# Patient Record
Sex: FEMALE | Race: WHITE | NOT HISPANIC OR LATINO | Employment: FULL TIME | URBAN - METROPOLITAN AREA
[De-identification: names, ages, dates, MRNs, and addresses within clinical notes are randomized per-mention and may not be internally consistent; named-entity substitution may affect disease eponyms.]

---

## 2023-04-24 ENCOUNTER — OFFICE VISIT (OUTPATIENT)
Dept: PRIMARY CARE | Facility: CLINIC | Age: 33
End: 2023-04-24
Payer: MEDICAID

## 2023-04-24 ENCOUNTER — LAB (OUTPATIENT)
Dept: LAB | Facility: LAB | Age: 33
End: 2023-04-24
Payer: MEDICAID

## 2023-04-24 VITALS
BODY MASS INDEX: 20.88 KG/M2 | DIASTOLIC BLOOD PRESSURE: 78 MMHG | SYSTOLIC BLOOD PRESSURE: 106 MMHG | HEIGHT: 67 IN | OXYGEN SATURATION: 98 % | WEIGHT: 133 LBS | HEART RATE: 64 BPM

## 2023-04-24 DIAGNOSIS — G43.109 MIGRAINE WITH AURA AND WITHOUT STATUS MIGRAINOSUS, NOT INTRACTABLE: ICD-10-CM

## 2023-04-24 DIAGNOSIS — R35.0 URINARY FREQUENCY: Primary | ICD-10-CM

## 2023-04-24 DIAGNOSIS — L65.9 HAIR LOSS: ICD-10-CM

## 2023-04-24 DIAGNOSIS — R30.0 DYSURIA: ICD-10-CM

## 2023-04-24 LAB
CALCIDIOL (25 OH VITAMIN D3) (NG/ML) IN SER/PLAS: 70 NG/ML
ERYTHROCYTE DISTRIBUTION WIDTH (RATIO) BY AUTOMATED COUNT: 12.6 % (ref 11.5–14.5)
ERYTHROCYTE MEAN CORPUSCULAR HEMOGLOBIN CONCENTRATION (G/DL) BY AUTOMATED: 32.9 G/DL (ref 32–36)
ERYTHROCYTE MEAN CORPUSCULAR VOLUME (FL) BY AUTOMATED COUNT: 89 FL (ref 80–100)
ERYTHROCYTES (10*6/UL) IN BLOOD BY AUTOMATED COUNT: 4.75 X10E12/L (ref 4–5.2)
HEMATOCRIT (%) IN BLOOD BY AUTOMATED COUNT: 42.5 % (ref 36–46)
HEMOGLOBIN (G/DL) IN BLOOD: 14 G/DL (ref 12–16)
LEUKOCYTES (10*3/UL) IN BLOOD BY AUTOMATED COUNT: 4.4 X10E9/L (ref 4.4–11.3)
PLATELETS (10*3/UL) IN BLOOD AUTOMATED COUNT: 353 X10E9/L (ref 150–450)
POC APPEARANCE, URINE: CLEAR
POC BILIRUBIN, URINE: NEGATIVE
POC BLOOD, URINE: NEGATIVE
POC COLOR, URINE: YELLOW
POC GLUCOSE, URINE: NEGATIVE MG/DL
POC KETONES, URINE: NEGATIVE MG/DL
POC LEUKOCYTES, URINE: NEGATIVE
POC NITRITE,URINE: NEGATIVE
POC PH, URINE: 7 PH
POC PROTEIN, URINE: NEGATIVE MG/DL
POC SPECIFIC GRAVITY, URINE: 1.01
POC UROBILINOGEN, URINE: 0.2 EU/DL
THYROTROPIN (MIU/L) IN SER/PLAS BY DETECTION LIMIT <= 0.05 MIU/L: 1.07 MIU/L (ref 0.44–3.98)

## 2023-04-24 PROCEDURE — 82306 VITAMIN D 25 HYDROXY: CPT

## 2023-04-24 PROCEDURE — 85027 COMPLETE CBC AUTOMATED: CPT

## 2023-04-24 PROCEDURE — 84443 ASSAY THYROID STIM HORMONE: CPT

## 2023-04-24 PROCEDURE — 82728 ASSAY OF FERRITIN: CPT

## 2023-04-24 PROCEDURE — 81003 URINALYSIS AUTO W/O SCOPE: CPT | Performed by: INTERNAL MEDICINE

## 2023-04-24 PROCEDURE — 36415 COLL VENOUS BLD VENIPUNCTURE: CPT

## 2023-04-24 PROCEDURE — 99203 OFFICE O/P NEW LOW 30 MIN: CPT | Performed by: INTERNAL MEDICINE

## 2023-04-24 PROCEDURE — 87086 URINE CULTURE/COLONY COUNT: CPT

## 2023-04-24 PROCEDURE — 1036F TOBACCO NON-USER: CPT | Performed by: INTERNAL MEDICINE

## 2023-04-24 RX ORDER — ACETAMINOPHEN 500 MG
TABLET ORAL DAILY
COMMUNITY

## 2023-04-24 RX ORDER — UBIDECARENONE 30 MG
30 CAPSULE ORAL DAILY
COMMUNITY
End: 2023-11-14 | Stop reason: WASHOUT

## 2023-04-24 RX ORDER — ATOGEPANT 30 MG/1
TABLET ORAL
COMMUNITY
Start: 2023-03-29 | End: 2023-11-14 | Stop reason: WASHOUT

## 2023-04-24 RX ORDER — EPINEPHRINE 0.3 MG/.3ML
0.3 INJECTION SUBCUTANEOUS
COMMUNITY
Start: 2021-05-19 | End: 2023-11-14 | Stop reason: WASHOUT

## 2023-04-24 RX ORDER — CHOLECALCIFEROL (VITAMIN D3) 125 MCG
CAPSULE ORAL
COMMUNITY

## 2023-04-24 RX ORDER — MAGNESIUM 250 MG
250 TABLET ORAL 2 TIMES DAILY
COMMUNITY

## 2023-04-24 RX ORDER — UBROGEPANT 100 MG/1
TABLET ORAL
COMMUNITY

## 2023-04-24 ASSESSMENT — PATIENT HEALTH QUESTIONNAIRE - PHQ9
SUM OF ALL RESPONSES TO PHQ9 QUESTIONS 1 & 2: 0
1. LITTLE INTEREST OR PLEASURE IN DOING THINGS: NOT AT ALL
2. FEELING DOWN, DEPRESSED OR HOPELESS: NOT AT ALL

## 2023-04-24 NOTE — PROGRESS NOTES
Subjective   Patient ID: [id number]      Tobias Mejia is a 32 y.o. female who presents for New Patient Visit.    Current Outpatient Medications:     cholecalciferol (Vitamin D3) 50 mcg (2,000 unit) capsule, Take by mouth once daily., Disp: , Rfl:     co-enzyme Q-10 30 mg capsule, Take 1 capsule (30 mg) by mouth once daily., Disp: , Rfl:     EPINEPHrine 0.3 mg/0.3 mL injection syringe, Inject 0.3 mL (0.3 mg) into the shoulder, thigh, or buttocks., Disp: , Rfl:     magnesium 250 mg tablet, Take 1 tablet (250 mg) by mouth in the morning and 1 tablet (250 mg) in the evening., Disp: , Rfl:     MULTIVITAMIN ORAL, Take by mouth., Disp: , Rfl:     omega 3-dha-epa-fish oil 850-1,400 mg capsule, Take by mouth., Disp: , Rfl:     onabotulinumtoxinA (Botox) 100 unit injection, Inject as directed every 3 months., Disp: , Rfl:     Qulipta 30 mg tablet, , Disp: , Rfl:     Ubrelvy 100 mg tablet tablet, take 1 tablet by mouth ON THE ONSET OF MIGRAINE, Disp: , Rfl:   HPI  32-year-old female patient presenting to the office today to establish care and to discuss few concerns.    Patient has been having recurrent urinary symptoms, she has been experiencing dysuria and increased frequency with urination lately.  She has been treated for urinary tract infection 3 times within the last 3 months.  She has not had issues with urinary infections in the past.  She has been trying to increase her fluid intake and empty the bladder around the clock.    She also is known to have history of migraine headaches she is actively followed by neurologist with [facility] she recently was started on new preventative medication that she is not sure if it is helping yet its been a week.  She has noticed lately that she is losing hair significantly and feeling tired and sluggish and having constipation she wonders about her thyroid levels.  Review of system was reviewed all normal except what is noted in HPI   Past Medical History:   Diagnosis  "Date    Migraine with aura, intractable, with status migrainosus 12/17/2021    Intractable migraine with aura with status migrainosus    Other conditions influencing health status 05/20/2022    Chronic migraine    Other specified endocrine disorders 01/29/2022    Cyst of pineal gland      Objective   /78   Pulse 64   Ht 1.702 m (5' 7\")   Wt 60.3 kg (133 lb)   SpO2 98%   BMI 20.83 kg/m²      Physical Exam  Constitutional:       Appearance: Normal appearance.   Cardiovascular:      Rate and Rhythm: Normal rate and regular rhythm.      Pulses: Normal pulses.      Heart sounds: Normal heart sounds.   Pulmonary:      Effort: Pulmonary effort is normal.      Breath sounds: Normal breath sounds.   Musculoskeletal:      Cervical back: Normal range of motion and neck supple.   Neurological:      Mental Status: She is alert.   Psychiatric:         Mood and Affect: Mood normal.         Behavior: Behavior normal.         Thought Content: Thought content normal.         Judgment: Judgment normal.         Assessment/Plan   Problem List Items Addressed This Visit    None  Visit Diagnoses       Urinary frequency    -  Primary    Relevant Orders    POCT UA Automated manually resulted    Urine Culture    Dysuria        Relevant Orders    POCT UA Automated manually resulted    Urine Culture    Hair loss        Relevant Orders    CBC    TSH with reflex to Free T4 if abnormal    Vitamin D 25 hydroxy    Ferritin    Migraine with aura and without status migrainosus, not intractable        Relevant Orders    Vitamin D 25 hydroxy    Ferritin          32-year-old patient with the following issues.    1.  Concerns regarding urinary symptoms, at this point we are going to obtain a urine analysis and send it for culture we will treat the patient accordingly.  Patient was advised to increase her fluid intake and empty the bladder around the clock and take cranberry extract tablets and if she continues to have the recurrence we will " refer to urology for further evaluation.    2.  Concerns regarding hair loss and constipation and feeling cold at all times we are going to make sure we rule out any thyroid abnormalities and anemia and we will discuss the results with the patient as available.    3.  Recurrent migraines actively followed by the neurology department currently started on preventive treatment in addition to her abortive treatment she was encouraged to continue.    Disposition I will see the patient back in the office in 3 months for her annual exam and sooner if needed.    Audrey Ramos MD

## 2023-04-25 LAB
FERRITIN (UG/LL) IN SER/PLAS: 33 UG/L (ref 8–150)
URINE CULTURE: NORMAL

## 2023-10-16 ENCOUNTER — SPECIALTY PHARMACY (OUTPATIENT)
Dept: PHARMACY | Facility: CLINIC | Age: 33
End: 2023-10-16

## 2023-10-16 ENCOUNTER — PHARMACY VISIT (OUTPATIENT)
Dept: PHARMACY | Facility: CLINIC | Age: 33
End: 2023-10-16
Payer: MEDICAID

## 2023-10-16 ENCOUNTER — TELEPHONE (OUTPATIENT)
Dept: PRIMARY CARE | Facility: CLINIC | Age: 33
End: 2023-10-16
Payer: MEDICAID

## 2023-10-16 PROCEDURE — RXMED WILLOW AMBULATORY MEDICATION CHARGE

## 2023-10-16 NOTE — TELEPHONE ENCOUNTER
Left detailed msg for pt to call back and speak with us at  if she may have lvm by mistake on refill line.

## 2023-10-19 ENCOUNTER — APPOINTMENT (OUTPATIENT)
Dept: PRIMARY CARE | Facility: CLINIC | Age: 33
End: 2023-10-19
Payer: MEDICAID

## 2023-10-24 ENCOUNTER — APPOINTMENT (OUTPATIENT)
Dept: PRIMARY CARE | Facility: CLINIC | Age: 33
End: 2023-10-24
Payer: MEDICAID

## 2023-11-08 ENCOUNTER — PHARMACY VISIT (OUTPATIENT)
Dept: PHARMACY | Facility: CLINIC | Age: 33
End: 2023-11-08
Payer: MEDICAID

## 2023-11-08 PROCEDURE — RXMED WILLOW AMBULATORY MEDICATION CHARGE

## 2023-11-12 PROBLEM — F43.10 PTSD (POST-TRAUMATIC STRESS DISORDER): Status: ACTIVE | Noted: 2018-02-05

## 2023-11-12 PROBLEM — F43.21 GRIEF: Status: ACTIVE | Noted: 2017-07-06

## 2023-11-12 PROBLEM — E34.8 CYST OF PINEAL GLAND: Status: ACTIVE | Noted: 2023-11-12

## 2023-11-12 PROBLEM — G43.111 INTRACTABLE MIGRAINE WITH AURA WITH STATUS MIGRAINOSUS: Status: ACTIVE | Noted: 2023-11-12

## 2023-11-12 PROBLEM — G43.109 MIGRAINE WITH VISUAL AURA: Status: ACTIVE | Noted: 2023-11-12

## 2023-11-12 PROBLEM — G43.709 CHRONIC MIGRAINE WITHOUT AURA: Status: ACTIVE | Noted: 2023-11-12

## 2023-11-12 PROBLEM — S20.219A RIB CONTUSION: Status: ACTIVE | Noted: 2023-11-12

## 2023-11-12 PROBLEM — F32.A DEPRESSION: Status: ACTIVE | Noted: 2017-07-06

## 2023-11-12 RX ORDER — KETOTIFEN FUMARATE 0.35 MG/ML
1 SOLUTION/ DROPS OPHTHALMIC 2 TIMES DAILY
COMMUNITY
End: 2023-11-14 | Stop reason: WASHOUT

## 2023-11-12 RX ORDER — AZELASTINE 1 MG/ML
2 SPRAY, METERED NASAL 2 TIMES DAILY PRN
COMMUNITY
End: 2023-11-14 | Stop reason: WASHOUT

## 2023-11-12 RX ORDER — FLUTICASONE PROPIONATE 50 MCG
2 SPRAY, SUSPENSION (ML) NASAL DAILY
COMMUNITY
End: 2023-11-14 | Stop reason: WASHOUT

## 2023-11-12 RX ORDER — DESLORATADINE 5 MG/1
5 TABLET ORAL
COMMUNITY
Start: 2021-05-19 | End: 2023-11-14 | Stop reason: WASHOUT

## 2023-11-14 ENCOUNTER — OFFICE VISIT (OUTPATIENT)
Dept: PRIMARY CARE | Facility: CLINIC | Age: 33
End: 2023-11-14
Payer: MEDICAID

## 2023-11-14 VITALS
HEIGHT: 66 IN | OXYGEN SATURATION: 99 % | DIASTOLIC BLOOD PRESSURE: 82 MMHG | WEIGHT: 138.2 LBS | SYSTOLIC BLOOD PRESSURE: 104 MMHG | BODY MASS INDEX: 22.21 KG/M2 | HEART RATE: 70 BPM

## 2023-11-14 DIAGNOSIS — Z00.00 HEALTH CARE MAINTENANCE: Primary | ICD-10-CM

## 2023-11-14 DIAGNOSIS — Z80.8 FAMILY HISTORY OF MELANOMA: ICD-10-CM

## 2023-11-14 DIAGNOSIS — L65.9 HAIR LOSS: ICD-10-CM

## 2023-11-14 DIAGNOSIS — N92.1 MENORRHAGIA WITH IRREGULAR CYCLE: ICD-10-CM

## 2023-11-14 DIAGNOSIS — D22.9 CHANGE IN MOLE: ICD-10-CM

## 2023-11-14 PROCEDURE — 99395 PREV VISIT EST AGE 18-39: CPT | Performed by: INTERNAL MEDICINE

## 2023-11-14 PROCEDURE — 1036F TOBACCO NON-USER: CPT | Performed by: INTERNAL MEDICINE

## 2023-11-14 RX ORDER — UBIDECARENONE 30 MG
30 CAPSULE ORAL DAILY
COMMUNITY

## 2023-11-14 NOTE — PROGRESS NOTES
Subjective   Patient ID: Tobias Mejia is a 33 y.o. female who presents for Annual Exam.    HPI   Patient presents today to annual exam.     Patient states that her periods have been behind since May but she is still having them.     Review of Systems    Objective   There were no vitals taken for this visit.    Physical Exam    Assessment/Plan   Problem List Items Addressed This Visit    None

## 2023-11-14 NOTE — PROGRESS NOTES
"Reason for Visit: Annual Physical Exam  Allergies and Medications  Green bean, Legumes, Peanut (legumes), Pistachio nut, Strawberry, Copper, House dust mite, and Tree and shrub pollen  Current Outpatient Medications   Medication Instructions    cholecalciferol (Vitamin D3) 50 mcg (2,000 unit) capsule oral, Daily    co-enzyme Q-10 30 mg, oral, Daily    fremanezumab (Ajovy) 225 mg/1.5 mL auto-injector INJECT 225MG (1 PEN) UNDER THE SKIN ONCE MONTHLY    magnesium 250 mg, oral, 2 times daily    MULTIVITAMIN ORAL oral    omega 3-dha-epa-fish oil 850-1,400 mg capsule oral    onabotulinumtoxinA (Botox) 100 unit injection injection, Every 3 months    Ubrelvy 100 mg tablet tablet take 1 tablet by mouth ON THE ONSET OF MIGRAINE     HPI:  53-year-old female patient presented to the office today for her annual exam.  Patient is doing really well denying episodes of chest pain and shortness of breath on minimal exertion, no abdominal pain nausea or vomiting she is always constipated and she is willing to try MiraLAX actually we did discuss it today at length.  No urinary symptoms.  She still is concerned about hair loss that she does have heavy cycles.  Sometimes her cycle is 6 weeks apart but then when she does have it it is really heavy and she has had a clotting for 5 days.    ROS otherwise negative aside from what was mentioned above in HPI.    Vitals  /82 (BP Location: Left arm, Patient Position: Sitting)   Pulse 70   Ht 1.683 m (5' 6.25\")   Wt 62.7 kg (138 lb 3.2 oz)   SpO2 99%   BMI 22.14 kg/m²   Body mass index is 22.14 kg/m².  Physical Exam  Physical Exam  Constitutional:       Appearance: Normal appearance.   Eyes:      Extraocular Movements: Extraocular movements intact.      Pupils: Pupils are equal, round, and reactive to light.   Cardiovascular:      Rate and Rhythm: Normal rate and regular rhythm.      Pulses: Normal pulses.      Heart sounds: Normal heart sounds.   Pulmonary:      Effort: Pulmonary " effort is normal.      Breath sounds: Normal breath sounds.   Chest:      Comments: Breast exam was completed no asymmetry masses or discharge.  Neurological:      General: No focal deficit present.      Mental Status: She is alert. Mental status is at baseline.   Psychiatric:         Mood and Affect: Mood normal.         Behavior: Behavior normal.         Thought Content: Thought content normal.         Judgment: Judgment normal.        Active Problem List    Comprehensive Medical/Surgical/Social/Family History  Past Medical History:   Diagnosis Date    Migraine with aura, intractable, with status migrainosus 12/17/2021    Intractable migraine with aura with status migrainosus    Other conditions influencing health status 05/20/2022    Chronic migraine    Other specified endocrine disorders 01/29/2022    Cyst of pineal gland     Past Surgical History:   Procedure Laterality Date    MR HEAD ANGIO WO IV CONTRAST  1/10/2022    MR HEAD ANGIO WO IV CONTRAST 1/10/2022 [facility]    OTHER SURGICAL HISTORY  05/03/2022    No history of surgery     Social History     Social History Narrative    Not on file   Patient does not smoke and does not drink alcohol does not drink caffeine she works in [employer] company as she.  She has 1 daughter currently in good health.  Family History   Problem Relation Name Age of Onset    Other (ocular melanoma) Mother     She has 1 twin sister in good health.      Assessment and Plan:  Problem List Items Addressed This Visit    None  Visit Diagnoses       Health care maintenance    -  Primary    Relevant Orders    CBC    Comprehensive Metabolic Panel    Lipid Panel    TSH with reflex to Free T4 if abnormal    Menorrhagia with irregular cycle        Relevant Orders    US pelvis    Hair loss        Relevant Orders    Referral to Dermatology        53-year-old patient with the following issues.    1.  Ongoing concerns regarding menorrhagia and increased clotting with the cycle.  At this  point pelvic ultrasound will be obtained CBC will be obtained her most recent ferritin was appropriate we will discuss the results with the patient once available.    2.  Ongoing her loss her TSH is normal we are going to do dermatology referral and she does have family history of ocular melanoma and we will make sure her moles are checked carefully and thoroughly with dermatology as well.    3.  Health maintenance issues lab work is requested patient is up-to-date with her Pap smear.    Disposition I will see the patient back in the office in 1 year for repeat physical and sooner if needed.

## 2023-11-15 ENCOUNTER — LAB (OUTPATIENT)
Dept: LAB | Facility: LAB | Age: 33
End: 2023-11-15
Payer: MEDICAID

## 2023-11-15 DIAGNOSIS — Z00.00 HEALTH CARE MAINTENANCE: ICD-10-CM

## 2023-11-15 LAB
ALBUMIN SERPL BCP-MCNC: 4.2 G/DL (ref 3.4–5)
ALP SERPL-CCNC: 59 U/L (ref 33–110)
ALT SERPL W P-5'-P-CCNC: 11 U/L (ref 7–45)
ANION GAP SERPL CALC-SCNC: 14 MMOL/L (ref 10–20)
AST SERPL W P-5'-P-CCNC: 15 U/L (ref 9–39)
BILIRUB SERPL-MCNC: 0.5 MG/DL (ref 0–1.2)
BUN SERPL-MCNC: 14 MG/DL (ref 6–23)
CALCIUM SERPL-MCNC: 9.1 MG/DL (ref 8.6–10.3)
CHLORIDE SERPL-SCNC: 104 MMOL/L (ref 98–107)
CHOLEST SERPL-MCNC: 159 MG/DL (ref 0–199)
CHOLESTEROL/HDL RATIO: 3
CO2 SERPL-SCNC: 27 MMOL/L (ref 21–32)
CREAT SERPL-MCNC: 0.6 MG/DL (ref 0.5–1.05)
ERYTHROCYTE [DISTWIDTH] IN BLOOD BY AUTOMATED COUNT: 13.3 % (ref 11.5–14.5)
GFR SERPL CREATININE-BSD FRML MDRD: >90 ML/MIN/1.73M*2
GLUCOSE SERPL-MCNC: 77 MG/DL (ref 74–99)
HCT VFR BLD AUTO: 39.3 % (ref 36–46)
HDLC SERPL-MCNC: 53.4 MG/DL
HGB BLD-MCNC: 12.7 G/DL (ref 12–16)
LDLC SERPL CALC-MCNC: 94 MG/DL
MCH RBC QN AUTO: 29.3 PG (ref 26–34)
MCHC RBC AUTO-ENTMCNC: 32.3 G/DL (ref 32–36)
MCV RBC AUTO: 91 FL (ref 80–100)
NON HDL CHOLESTEROL: 106 MG/DL (ref 0–149)
NRBC BLD-RTO: 0 /100 WBCS (ref 0–0)
PLATELET # BLD AUTO: 336 X10*3/UL (ref 150–450)
POTASSIUM SERPL-SCNC: 3.7 MMOL/L (ref 3.5–5.3)
PROT SERPL-MCNC: 6.9 G/DL (ref 6.4–8.2)
RBC # BLD AUTO: 4.33 X10*6/UL (ref 4–5.2)
SODIUM SERPL-SCNC: 141 MMOL/L (ref 136–145)
TRIGL SERPL-MCNC: 56 MG/DL (ref 0–149)
TSH SERPL-ACNC: 1.59 MIU/L (ref 0.44–3.98)
VLDL: 11 MG/DL (ref 0–40)
WBC # BLD AUTO: 4.2 X10*3/UL (ref 4.4–11.3)

## 2023-11-15 PROCEDURE — 36415 COLL VENOUS BLD VENIPUNCTURE: CPT

## 2023-11-15 PROCEDURE — 84443 ASSAY THYROID STIM HORMONE: CPT

## 2023-11-15 PROCEDURE — 85027 COMPLETE CBC AUTOMATED: CPT

## 2023-11-15 PROCEDURE — 80053 COMPREHEN METABOLIC PANEL: CPT

## 2023-11-15 PROCEDURE — 80061 LIPID PANEL: CPT

## 2023-11-17 ENCOUNTER — TELEPHONE (OUTPATIENT)
Dept: PRIMARY CARE | Facility: CLINIC | Age: 33
End: 2023-11-17
Payer: MEDICAID

## 2023-11-17 NOTE — TELEPHONE ENCOUNTER
----- Message from Audrey Ramos MD sent at 11/16/2023  4:49 PM EST -----  Can you please notify the patient with the results of her blood work indicating normal thyroid function no evidence of anemia, excellent kidney and liver function normal fasting blood sugar.  Her lipid profile is excellent as well.  She has any questions or concerns she is to contact my office anytime.

## 2023-11-20 ENCOUNTER — PROCEDURE VISIT (OUTPATIENT)
Dept: NEUROLOGY | Facility: CLINIC | Age: 33
End: 2023-11-20
Payer: MEDICAID

## 2023-11-20 ENCOUNTER — HOSPITAL ENCOUNTER (OUTPATIENT)
Dept: RADIOLOGY | Facility: HOSPITAL | Age: 33
Discharge: HOME | End: 2023-11-20
Payer: MEDICAID

## 2023-11-20 DIAGNOSIS — G43.711 CHRONIC MIGRAINE WITHOUT AURA, WITH INTRACTABLE MIGRAINE, SO STATED, WITH STATUS MIGRAINOSUS: Primary | ICD-10-CM

## 2023-11-20 DIAGNOSIS — N92.1 MENORRHAGIA WITH IRREGULAR CYCLE: ICD-10-CM

## 2023-11-20 PROCEDURE — 64615 CHEMODENERV MUSC MIGRAINE: CPT | Performed by: STUDENT IN AN ORGANIZED HEALTH CARE EDUCATION/TRAINING PROGRAM

## 2023-11-20 PROCEDURE — 76830 TRANSVAGINAL US NON-OB: CPT

## 2023-11-20 ASSESSMENT — ENCOUNTER SYMPTOMS
DEPRESSION: 0
LOSS OF SENSATION IN FEET: 0
OCCASIONAL FEELINGS OF UNSTEADINESS: 0

## 2023-11-20 ASSESSMENT — PATIENT HEALTH QUESTIONNAIRE - PHQ9
2. FEELING DOWN, DEPRESSED OR HOPELESS: NOT AT ALL
SUM OF ALL RESPONSES TO PHQ9 QUESTIONS 1 AND 2: 0
1. LITTLE INTEREST OR PLEASURE IN DOING THINGS: NOT AT ALL

## 2023-11-20 NOTE — PROGRESS NOTES
Neurology Botulinum Injection    Subjective   Tobias Mejia is an 33 y.o. female here for medical botulinum injection for Chronic migraine without aura, with intractable migraine, so stated, with status migrainosus [G43.711].     Headaches have reduced to 6/30 HA days per month since starting concomitant Botox and Ajovy. Denies any side effects to either medication. Ubrelvy effective for breakthrough headaches. Notes increased headaches in the last 3 weeks before todays Botox.    Head/Face/Jaw Botulinum Injection    Date/Time: 11/20/2023 10:12 AM    Performed by: Brent Alarcon DO  Authorized by: Brent Alarcon DO      Procedure details:        Total units injected:  0     Total units wasted:  0    Comments: Procedure Note: PREEMPT Botox    Indications: Chronic Migraine    Informed consent was obtained (explaining the procedure and risks and benefits of procedure) from patient: the signed consent form was placed in the medical record.  A time out was completed, verifying correct patient, procedure,site, positioning, and implants or special equipment.    200 units of OnabotulinumtoxinA were reconstituted with saline. Sites of injection were identified via PREEMPT protocol and cleaned with alcohol pads. Using a 30 gauge needle, patient received following injections:    5 units in procerus  5 units in each left and right   10 units divided between 2 sites of L frontalis  10 units divided between 2 sites of R frontalis  20 units divided between 4 sites of L temporalis  20 units divided between 4 sites of R temporalis  15 units divided between 3 sites of L occipitalis  15 units divided between 3 sites of R occipitalis  10 units divided between 2 sites of L paracervical muscles  10 units divided between 2 sites of R paracervical muscles  15 units divided between 3 sites of L trapezius  15 units divided between 3 sites of R trapezius    Additional Sites:  10u R temporalis  10u L temporalis  5u R  masseter  5u L masseter    Used: 185u  Wasted: 15u    There were no complications. Patient was comfortable and left without complaint.     OnabotulinumtoxinA  Lot #: [id number]  Exp: 3/2026

## 2023-11-21 ENCOUNTER — APPOINTMENT (OUTPATIENT)
Dept: PRIMARY CARE | Facility: CLINIC | Age: 33
End: 2023-11-21
Payer: MEDICAID

## 2023-12-08 PROCEDURE — RXMED WILLOW AMBULATORY MEDICATION CHARGE

## 2023-12-12 ENCOUNTER — PHARMACY VISIT (OUTPATIENT)
Dept: PHARMACY | Facility: CLINIC | Age: 33
End: 2023-12-12
Payer: MEDICAID

## 2024-01-05 ENCOUNTER — SPECIALTY PHARMACY (OUTPATIENT)
Dept: PHARMACY | Facility: CLINIC | Age: 34
End: 2024-01-05

## 2024-01-05 NOTE — PROGRESS NOTES
[facility] Clinical Note    Tobias Mejia is a 33 y.o. female, who is on the specialty pharmacy service for management of: Migraine Core with status of: (Enrolled)     Tobias was contacted on 1/5/2024.    Refer to the encounter summary report for documentation details about patient counseling and education.      Medication Adherence  The importance of adherence was discussed with the patient and they were advised to take the medication as prescribed by their provider. Tobias was encouraged to call her physician's office if they have a question regarding a missed dose.     Conclusion  Rate your quality of life on scale of 1-10: 7  Rate your satisfaction with  Specialty Pharmacy on scale of 1-10: 9      Patient advised to contact the pharmacy if there are any changes to her medication list, including prescriptions, OTC medications, herbal products, or supplements. Patient was advised of [facility]’s dispensing process, refill timeline, contact information ([phone]), and patient management follow up. Patient confirmed understanding of education conducted during assessment. All patient questions and concerns were addressed to the best of my ability. Patient was encouraged to contact the specialty pharmacy with any questions or concerns.    Confirmed follow-up outreaches are properly scheduled. Reviewed goals of therapy in the program targets.    Shan Wilde, ThereseD

## 2024-01-08 ENCOUNTER — PHARMACY VISIT (OUTPATIENT)
Dept: PHARMACY | Facility: CLINIC | Age: 34
End: 2024-01-08
Payer: MEDICAID

## 2024-01-08 PROCEDURE — RXMED WILLOW AMBULATORY MEDICATION CHARGE

## 2024-02-05 ENCOUNTER — PHARMACY VISIT (OUTPATIENT)
Dept: PHARMACY | Facility: CLINIC | Age: 34
End: 2024-02-05
Payer: MEDICAID

## 2024-02-05 PROCEDURE — RXMED WILLOW AMBULATORY MEDICATION CHARGE

## 2024-02-19 ENCOUNTER — PROCEDURE VISIT (OUTPATIENT)
Dept: NEUROLOGY | Facility: CLINIC | Age: 34
End: 2024-02-19
Payer: MEDICAID

## 2024-02-19 ENCOUNTER — OFFICE VISIT (OUTPATIENT)
Dept: DERMATOLOGY | Facility: CLINIC | Age: 34
End: 2024-02-19
Payer: MEDICAID

## 2024-02-19 DIAGNOSIS — G43.709 CHRONIC MIGRAINE WITHOUT AURA WITHOUT STATUS MIGRAINOSUS, NOT INTRACTABLE: Primary | ICD-10-CM

## 2024-02-19 DIAGNOSIS — D22.9 MELANOCYTIC NEVUS, UNSPECIFIED LOCATION: ICD-10-CM

## 2024-02-19 DIAGNOSIS — B36.0 TINEA VERSICOLOR: ICD-10-CM

## 2024-02-19 DIAGNOSIS — D48.5 NEOPLASM OF UNCERTAIN BEHAVIOR OF SKIN: Primary | ICD-10-CM

## 2024-02-19 DIAGNOSIS — L65.9 ALOPECIA: ICD-10-CM

## 2024-02-19 PROCEDURE — 88305 TISSUE EXAM BY PATHOLOGIST: CPT | Performed by: DERMATOLOGY

## 2024-02-19 PROCEDURE — 1036F TOBACCO NON-USER: CPT | Performed by: STUDENT IN AN ORGANIZED HEALTH CARE EDUCATION/TRAINING PROGRAM

## 2024-02-19 PROCEDURE — 11102 TANGNTL BX SKIN SINGLE LES: CPT | Performed by: STUDENT IN AN ORGANIZED HEALTH CARE EDUCATION/TRAINING PROGRAM

## 2024-02-19 PROCEDURE — 64615 CHEMODENERV MUSC MIGRAINE: CPT | Performed by: STUDENT IN AN ORGANIZED HEALTH CARE EDUCATION/TRAINING PROGRAM

## 2024-02-19 PROCEDURE — 99204 OFFICE O/P NEW MOD 45 MIN: CPT | Performed by: STUDENT IN AN ORGANIZED HEALTH CARE EDUCATION/TRAINING PROGRAM

## 2024-02-19 RX ORDER — MINOXIDIL 2.5 MG/1
1.25 TABLET ORAL DAILY
Qty: 45 TABLET | Refills: 3 | Status: SHIPPED | OUTPATIENT
Start: 2024-02-19 | End: 2025-02-18

## 2024-02-19 RX ORDER — KETOCONAZOLE 20 MG/G
CREAM TOPICAL 2 TIMES DAILY
Qty: 30 G | Refills: 11 | Status: SHIPPED | OUTPATIENT
Start: 2024-02-19

## 2024-02-19 RX ORDER — KETOCONAZOLE 20 MG/ML
SHAMPOO, SUSPENSION TOPICAL 2 TIMES WEEKLY
Qty: 120 ML | Refills: 11 | Status: SHIPPED | OUTPATIENT
Start: 2024-02-19

## 2024-02-19 ASSESSMENT — DERMATOLOGY QUALITY OF LIFE (QOL) ASSESSMENT
WHAT SINGLE SKIN CONDITION LISTED BELOW IS THE PATIENT ANSWERING THE QUALITY-OF-LIFE ASSESSMENT QUESTIONS ABOUT: NONE OF THE ABOVE
WHAT SINGLE SKIN CONDITION LISTED BELOW IS THE PATIENT ANSWERING THE QUALITY-OF-LIFE ASSESSMENT QUESTIONS ABOUT: NONE OF THE ABOVE

## 2024-02-19 NOTE — PROGRESS NOTES
Neurology Botulinum Injection    Subjective   Tobias Mejia is an 33 y.o. female here for medical botulinum injection for Chronic migraine without aura without status migrainosus, not intractable [G43.709].     Headaches have worsened in the last 3 months to 20/30 HA days per month. Denies any side effects to Botox or Ajovy. She is unsure if it is weather related. Notes that she had 2 significant aura episodes where she lost vision for ~1 hour with color wheels, as well as experiencing paresthesias in LUE. Symptoms were associated with headaches. Has not had a headache for the last 14 days. Would like to proceed with current headache treatment plan, however if any worsening of headaches over the next quarter, would consider switching MAB at that time.    Head/Face/Jaw Botulinum Injection    Date/Time: 2/19/2024 9:24 AM    Performed by: Brent Alarcon DO  Authorized by: Brent Alarcon DO      Procedure details:        Total units injected:  0     Total units wasted:  0    Comments: Procedure Note: PREEMPT Botox    Indications: Chronic Migraine    Informed consent was obtained (explaining the procedure and risks and benefits of procedure) from patient: the signed consent form was placed in the medical record.  A time out was completed, verifying correct patient, procedure,site, positioning, and implants or special equipment.    200 units of OnabotulinumtoxinA were reconstituted with saline. Sites of injection were identified via PREEMPT protocol and cleaned with alcohol pads. Using a 30 gauge needle, patient received following injections:    5 units in procerus  5 units in each left and right   10 units divided between 2 sites of L frontalis  10 units divided between 2 sites of R frontalis  20 units divided between 4 sites of L temporalis  20 units divided between 4 sites of R temporalis  15 units divided between 3 sites of L occipitalis  15 units divided between 3 sites of R occipitalis  10 units  divided between 2 sites of L paracervical muscles  10 units divided between 2 sites of R paracervical muscles  15 units divided between 3 sites of L trapezius  15 units divided between 3 sites of R trapezius    Additional Sites:  10u L temporalis  10u R temporalis  5u R masseter  5u L masseter    Used: 185u  Wasted: 15u    There were no complications. Patient was comfortable and left without complaint.     OnabotulinumtoxinA  Lot #: [id number]  Exp: 6/2026
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
## 2024-02-21 LAB
LABORATORY COMMENT REPORT: NORMAL
PATH REPORT.FINAL DX SPEC: NORMAL
PATH REPORT.GROSS SPEC: NORMAL
PATH REPORT.MICROSCOPIC SPEC OTHER STN: NORMAL
PATH REPORT.RELEVANT HX SPEC: NORMAL
PATH REPORT.TOTAL CANCER: NORMAL

## 2024-02-21 NOTE — RESULT ENCOUNTER NOTE
"Patient has seen results and \"Patient Communication\" on 2/21/21 at 4:12pm of shave biopsy of the Mid Back: Mildly Dyplastic Compound Nevus"

## 2024-03-04 ENCOUNTER — SPECIALTY PHARMACY (OUTPATIENT)
Dept: PHARMACY | Facility: CLINIC | Age: 34
End: 2024-03-04

## 2024-03-20 ENCOUNTER — PHARMACY VISIT (OUTPATIENT)
Dept: PHARMACY | Facility: CLINIC | Age: 34
End: 2024-03-20
Payer: MEDICAID

## 2024-03-20 PROCEDURE — RXMED WILLOW AMBULATORY MEDICATION CHARGE

## 2024-04-03 ENCOUNTER — SPECIALTY PHARMACY (OUTPATIENT)
Dept: PHARMACY | Facility: CLINIC | Age: 34
End: 2024-04-03

## 2024-04-03 NOTE — PROGRESS NOTES
[facility] Clinical Note    Tobias Mejia is a 33 y.o. female, who is on the specialty pharmacy service of: Migraine Core.  Tobias Mejia is taking: Ajovy.     Tobias was contacted on 4/3/2024.    Refer to the encounter summary report for documentation details about patient counseling and education.      Impression/Plan  Is patient high risk? (potential patients:  pregnancy, geriatric, pediatric)   no  Is laboratory follow-up needed? no  Is a clinical intervention needed?  no  Next assessment date?  10/4/24  Additional comments:    Medication Adherence  The importance of adherence was discussed with the patient and they were advised to take the medication as prescribed by their provider. Tobias was encouraged to call her physician's office if they have a question regarding a missed dose.     QOL/Patient Satisfaction  Rate your quality of life on scale of 1-10: 7  Rate your satisfaction with  Specialty Pharmacy on scale of 1-10: 10 - Completely satisfied      Patient advised to contact the pharmacy if there are any changes to her medication list, including prescriptions, OTC medications, herbal products, or supplements. Patient was advised of [facility]’s dispensing process, refill timeline, contact information ([phone]), and patient management follow up. Patient confirmed understanding of education conducted during assessment. All patient questions and concerns were addressed to the best of my ability. Patient was encouraged to contact the specialty pharmacy with any questions or concerns.    Confirmed follow-up outreaches are properly scheduled. Reviewed goals of therapy in the program targets.    Jonel Gilbert, PharmD

## 2024-04-17 ENCOUNTER — SPECIALTY PHARMACY (OUTPATIENT)
Dept: PHARMACY | Facility: CLINIC | Age: 34
End: 2024-04-17

## 2024-04-17 DIAGNOSIS — G43.709 CHRONIC MIGRAINE WITHOUT AURA WITHOUT STATUS MIGRAINOSUS, NOT INTRACTABLE: Primary | ICD-10-CM

## 2024-04-17 PROCEDURE — RXMED WILLOW AMBULATORY MEDICATION CHARGE

## 2024-04-17 RX ORDER — FREMANEZUMAB-VFRM 225 MG/1.5ML
225 INJECTION SUBCUTANEOUS
Qty: 1.5 ML | Refills: 6 | Status: SHIPPED | OUTPATIENT
Start: 2024-04-17 | End: 2024-04-22 | Stop reason: SDUPTHER

## 2024-04-19 ENCOUNTER — PHARMACY VISIT (OUTPATIENT)
Dept: PHARMACY | Facility: CLINIC | Age: 34
End: 2024-04-19
Payer: MEDICAID

## 2024-04-22 DIAGNOSIS — G43.709 CHRONIC MIGRAINE WITHOUT AURA WITHOUT STATUS MIGRAINOSUS, NOT INTRACTABLE: ICD-10-CM

## 2024-04-22 RX ORDER — FREMANEZUMAB-VFRM 225 MG/1.5ML
225 INJECTION SUBCUTANEOUS
Qty: 1.5 ML | Refills: 11 | Status: SHIPPED | OUTPATIENT
Start: 2024-04-22 | End: 2024-05-20 | Stop reason: WASHOUT

## 2024-05-01 DIAGNOSIS — G43.709 CHRONIC MIGRAINE WITHOUT AURA WITHOUT STATUS MIGRAINOSUS, NOT INTRACTABLE: Primary | ICD-10-CM

## 2024-05-02 ENCOUNTER — SPECIALTY PHARMACY (OUTPATIENT)
Dept: PHARMACY | Facility: CLINIC | Age: 34
End: 2024-05-02

## 2024-05-02 PROCEDURE — RXMED WILLOW AMBULATORY MEDICATION CHARGE

## 2024-05-03 ENCOUNTER — SPECIALTY PHARMACY (OUTPATIENT)
Dept: PHARMACY | Facility: CLINIC | Age: 34
End: 2024-05-03

## 2024-05-07 ENCOUNTER — PHARMACY VISIT (OUTPATIENT)
Dept: PHARMACY | Facility: CLINIC | Age: 34
End: 2024-05-07
Payer: MEDICAID

## 2024-05-13 PROCEDURE — RXMED WILLOW AMBULATORY MEDICATION CHARGE

## 2024-05-14 ENCOUNTER — PHARMACY VISIT (OUTPATIENT)
Dept: PHARMACY | Facility: CLINIC | Age: 34
End: 2024-05-14
Payer: MEDICAID

## 2024-05-14 ENCOUNTER — SPECIALTY PHARMACY (OUTPATIENT)
Dept: PHARMACY | Facility: CLINIC | Age: 34
End: 2024-05-14

## 2024-05-20 ENCOUNTER — PROCEDURE VISIT (OUTPATIENT)
Dept: NEUROLOGY | Facility: CLINIC | Age: 34
End: 2024-05-20
Payer: MEDICAID

## 2024-05-20 DIAGNOSIS — G43.709 CHRONIC MIGRAINE WITHOUT AURA WITHOUT STATUS MIGRAINOSUS, NOT INTRACTABLE: Primary | ICD-10-CM

## 2024-05-20 PROCEDURE — 99214 OFFICE O/P EST MOD 30 MIN: CPT | Performed by: STUDENT IN AN ORGANIZED HEALTH CARE EDUCATION/TRAINING PROGRAM

## 2024-05-20 PROCEDURE — 64615 CHEMODENERV MUSC MIGRAINE: CPT | Performed by: STUDENT IN AN ORGANIZED HEALTH CARE EDUCATION/TRAINING PROGRAM

## 2024-05-20 RX ORDER — ALBUTEROL SULFATE 0.83 MG/ML
3 SOLUTION RESPIRATORY (INHALATION) AS NEEDED
OUTPATIENT
Start: 2024-05-20

## 2024-05-20 RX ORDER — FAMOTIDINE 10 MG/ML
20 INJECTION INTRAVENOUS ONCE AS NEEDED
OUTPATIENT
Start: 2024-05-20

## 2024-05-20 RX ORDER — EPTINEZUMAB-JJMR 100 MG/ML
100 INJECTION INTRAVENOUS
Qty: 1 ML | Refills: 3 | Status: SHIPPED | OUTPATIENT
Start: 2024-05-20

## 2024-05-20 RX ORDER — ATOGEPANT 30 MG/1
TABLET ORAL
COMMUNITY
Start: 2023-02-27

## 2024-05-20 RX ORDER — DIPHENHYDRAMINE HYDROCHLORIDE 50 MG/ML
50 INJECTION INTRAMUSCULAR; INTRAVENOUS AS NEEDED
OUTPATIENT
Start: 2024-05-20

## 2024-05-20 RX ORDER — EPINEPHRINE 0.3 MG/.3ML
0.3 INJECTION SUBCUTANEOUS EVERY 5 MIN PRN
OUTPATIENT
Start: 2024-05-20

## 2024-05-20 RX ORDER — ERENUMAB-AOOE 140 MG/ML
140 INJECTION, SOLUTION SUBCUTANEOUS
Qty: 1 EACH | Refills: 6 | Status: SHIPPED | OUTPATIENT
Start: 2024-05-20 | End: 2024-05-20 | Stop reason: WASHOUT

## 2024-05-20 NOTE — PROGRESS NOTES
Neurology Botulinum Injection    Subjective   Tobias Mejia is an 33 y.o. female here for medical botulinum injection for Chronic migraine without aura without status migrainosus, not intractable [G43.709].     Since last visit having 24/30 HA days per month. Ajovy and Botox seems to be helping in severity but not frequency. Denies any side effects to Botox or Ajovy. Has a latex allergy. Had 3 episodes of blurry vision since last Botox, but no clear aura.    Botox    Date/Time: 5/20/2024 8:36 AM    Performed by: Brent Alarcon DO  Authorized by: Brent Alarcon DO    Procedure specific details:      Procedure Note: PREEMPT Botox    Indications: Chronic Migraine    Informed consent was obtained (explaining the procedure and risks and benefits of procedure) from patient: the signed consent form was placed in the medical record.  A time out was completed, verifying correct patient, procedure,site, positioning, and implants or special equipment.    200 units of OnabotulinumtoxinA were reconstituted with saline. Sites of injection were identified via PREEMPT protocol and cleaned with alcohol pads. Using a 30 gauge needle, patient received following injections:    5 units in procerus  5 units in each left and right   10 units divided between 2 sites of L frontalis  10 units divided between 2 sites of R frontalis  20 units divided between 4 sites of L temporalis  20 units divided between 4 sites of R temporalis  15 units divided between 3 sites of L occipitalis  15 units divided between 3 sites of R occipitalis  10 units divided between 2 sites of L paracervical muscles  10 units divided between 2 sites of R paracervical muscles  15 units divided between 3 sites of L trapezius  15 units divided between 3 sites of R trapezius    Additional Sites:  10u L temporalis  10u R temporalis  5u L masseter  5u R masseter    Used: 185u  Wasted: 15u    There were no complications. Patient was comfortable and left without  complaint.     OnabotulinumtoxinA  Lot #: [id number]  Exp: 6/2026        Assessment/Plan   Patient with chronic migraine w/o aura. Headaches are controlled in severity, but not frequency. Will aim to switch from Ajovy to Vyepti. Will continue Botox. Continue Ubrelvy for breakthrough headaches.    - discontinue Ajovy  - start Vyepti 100mg quarterly  - continue Botox PREEMPT  - continue ubrelvy 100mg PRN

## 2024-06-28 ENCOUNTER — TELEPHONE (OUTPATIENT)
Dept: NEUROLOGY | Facility: CLINIC | Age: 34
End: 2024-06-28
Payer: MEDICAID

## 2024-07-03 ENCOUNTER — TELEPHONE (OUTPATIENT)
Dept: NEUROLOGY | Facility: CLINIC | Age: 34
End: 2024-07-03
Payer: MEDICAID

## 2024-07-05 DIAGNOSIS — G43.709 CHRONIC MIGRAINE WITHOUT AURA WITHOUT STATUS MIGRAINOSUS, NOT INTRACTABLE: Primary | ICD-10-CM

## 2024-07-05 RX ORDER — UBROGEPANT 100 MG/1
TABLET ORAL
Qty: 16 TABLET | Refills: 6 | Status: SHIPPED | OUTPATIENT
Start: 2024-07-05

## 2024-07-12 ENCOUNTER — DOCUMENTATION (OUTPATIENT)
Dept: INFUSION THERAPY | Facility: CLINIC | Age: 34
End: 2024-07-12
Payer: MEDICAID

## 2024-07-12 NOTE — PROGRESS NOTES
Patient to be scheduled for New Start of Vyepti infusions  For Diagnosis: Chronic Migraines    Any history of recent cardiovascular event? No  (Avoid use. Discuss with prescribing provider prior to proceeding)    Patient Active Problem List   Diagnosis    Migraine with visual aura    Intractable migraine with aura with status migrainosus    Grief    Depression    Cyst of pineal gland    Bloating    PTSD (post-traumatic stress disorder)    Rib contusion    Second degree burn of multiple fingers excluding thumb    Chronic migraine without aura      Past Medical History:   Diagnosis Date    Migraine with aura, intractable, with status migrainosus 12/17/2021    Intractable migraine with aura with status migrainosus    Other conditions influencing health status 05/20/2022    Chronic migraine    Other specified endocrine disorders 01/29/2022    Cyst of pineal gland        Start date: anytime - insurance approved. Patient will be called to schedule the appts in the next week    Okay to schedule for treatment as ordered by prescribing provider.     [facility]  [facility]  [address] [place] [id number]  Phone: [phone]  Fax: [phone]  [email or web address]

## 2024-08-16 ENCOUNTER — APPOINTMENT (OUTPATIENT)
Dept: INFUSION THERAPY | Facility: CLINIC | Age: 34
End: 2024-08-16
Payer: MEDICAID

## 2024-08-19 ENCOUNTER — APPOINTMENT (OUTPATIENT)
Dept: DERMATOLOGY | Facility: CLINIC | Age: 34
End: 2024-08-19
Payer: MEDICAID

## 2024-09-04 ENCOUNTER — APPOINTMENT (OUTPATIENT)
Dept: INFUSION THERAPY | Facility: CLINIC | Age: 34
End: 2024-09-04
Payer: MEDICAID

## 2024-09-23 ENCOUNTER — APPOINTMENT (OUTPATIENT)
Dept: NEUROLOGY | Facility: CLINIC | Age: 34
End: 2024-09-23
Payer: MEDICAID

## 2024-11-19 ENCOUNTER — LAB (OUTPATIENT)
Dept: LAB | Facility: LAB | Age: 34
End: 2024-11-19
Payer: MEDICARE

## 2024-11-19 ENCOUNTER — APPOINTMENT (OUTPATIENT)
Dept: PRIMARY CARE | Facility: CLINIC | Age: 34
End: 2024-11-19
Payer: MEDICAID

## 2024-11-19 VITALS
WEIGHT: 139.6 LBS | BODY MASS INDEX: 22.43 KG/M2 | HEART RATE: 69 BPM | OXYGEN SATURATION: 99 % | HEIGHT: 66 IN | DIASTOLIC BLOOD PRESSURE: 84 MMHG | SYSTOLIC BLOOD PRESSURE: 106 MMHG

## 2024-11-19 DIAGNOSIS — N90.89 VULVAR IRRITATION: ICD-10-CM

## 2024-11-19 DIAGNOSIS — R30.0 DYSURIA: ICD-10-CM

## 2024-11-19 DIAGNOSIS — N64.89 FULLNESS OF BREAST: ICD-10-CM

## 2024-11-19 DIAGNOSIS — Z15.89 GENETIC SUSCEPTIBILITY TO MALIGNANT HYPERTHERMIA DUE TO RYR1 GENE MUTATION: ICD-10-CM

## 2024-11-19 DIAGNOSIS — Z00.00 HEALTH CARE MAINTENANCE: Primary | ICD-10-CM

## 2024-11-19 PROCEDURE — 1036F TOBACCO NON-USER: CPT | Performed by: INTERNAL MEDICINE

## 2024-11-19 PROCEDURE — 87086 URINE CULTURE/COLONY COUNT: CPT

## 2024-11-19 PROCEDURE — 81001 URINALYSIS AUTO W/SCOPE: CPT

## 2024-11-19 PROCEDURE — 87205 SMEAR GRAM STAIN: CPT

## 2024-11-19 PROCEDURE — 3008F BODY MASS INDEX DOCD: CPT | Performed by: INTERNAL MEDICINE

## 2024-11-19 PROCEDURE — 99395 PREV VISIT EST AGE 18-39: CPT | Performed by: INTERNAL MEDICINE

## 2024-11-19 NOTE — PROGRESS NOTES
"Reason for Visit: Annual Physical Exam  Allergies and Medications  Green bean, Legumes, Peanut and related legumes, Pistachio nut, Strawberry, Copper, House dust mite, Latex, and Tree and shrub pollen  Current Outpatient Medications   Medication Instructions    cholecalciferol (Vitamin D3) 50 mcg (2,000 unit) capsule Daily    ketoconazole (NIZOral) 2 % cream Topical, 2 times daily, Until rash is clear    ketoconazole (NIZOral) 2 % shampoo Topical, 2 times weekly, Leave on 5 minutes before rinsing    minoxidil (LONITEN) 1.25 mg, oral, Daily    MULTIVITAMIN ORAL Take by mouth.    onabotulinumtoxinA (Botox) 100 unit injection Inject 200 units into the muscle every 3 months. For use by headache clinic    Ubrelvy 100 mg tablet tablet take 1 tablet by mouth ON THE ONSET OF MIGRAINE    Vyepti 100 mg, intravenous, Every 3 months     HPI:    34-year-old patient presented to the office today for her annual examination.  Patient is doing fairly well denying episodes of chest pain and shortness of breath not even on exertion no abdominal pain nausea or vomiting her constipation subsided when she did diet modification and she has been noticing burning sensation after intercourse and slight irritation and itching in the vaginal area she was recently treated for urinary tract infection.    She did share with me today that her father tested positive for a gene that is RYR1 gene that is linked to autosomal dominant malignant hyperthermia and autosomal dominant and autosomal recessive myopathy.  We will refer the patient to the genetic department for further evaluation.  ROS otherwise negative aside from what was mentioned above in HPI.    Vitals  /84 (BP Location: Left arm, Patient Position: Sitting, BP Cuff Size: Adult)   Pulse 69   Ht 1.683 m (5' 6.25\")   Wt 63.3 kg (139 lb 9.6 oz)   SpO2 99%   BMI 22.36 kg/m²   Body mass index is 22.36 kg/m².  Physical Exam  Physical Exam  Constitutional:       Appearance: Normal " appearance.   HENT:      Head: Normocephalic and atraumatic.   Eyes:      Extraocular Movements: Extraocular movements intact.      Pupils: Pupils are equal, round, and reactive to light.   Cardiovascular:      Rate and Rhythm: Normal rate and regular rhythm.      Pulses: Normal pulses.      Heart sounds: Normal heart sounds.   Pulmonary:      Effort: Pulmonary effort is normal.      Breath sounds: Normal breath sounds.   Chest:      Comments: Breast exam completed there was increased density noted in the outer upper quadrant of the left breast slightly tender to exam.  Abdominal:      General: Abdomen is flat. Bowel sounds are normal.      Palpations: Abdomen is soft.   Genitourinary:     Comments: Pelvic exam completed vaginal swab was obtained.  Neurological:      General: No focal deficit present.      Mental Status: She is alert. Mental status is at baseline.   Psychiatric:         Mood and Affect: Mood normal.         Behavior: Behavior normal.         Thought Content: Thought content normal.         Judgment: Judgment normal.          Active Problem List    Comprehensive Medical/Surgical/Social/Family History  Past Medical History:   Diagnosis Date    Migraine with aura, intractable, with status migrainosus 12/17/2021    Intractable migraine with aura with status migrainosus    Other conditions influencing health status 05/20/2022    Chronic migraine    Other specified endocrine disorders 01/29/2022    Cyst of pineal gland     Past Surgical History:   Procedure Laterality Date    MR HEAD ANGIO WO IV CONTRAST  1/10/2022    MR HEAD ANGIO WO IV CONTRAST 1/10/2022 [facility]    OTHER SURGICAL HISTORY  05/03/2022    No history of surgery     Social History     Social History Narrative    Not on file   No smoking no alcohol , no coffee , with one daughter , work as a  .  Family History   Problem Relation Name Age of Onset    Other (ocular melanoma) Mother        Aunt on dad side breast cancer  .    Assessment and Plan:  Problem List Items Addressed This Visit    None  34-year-old patient with the following issues.    1.  Migraine headaches actively followed by neurology receiving Botox treatment and responding well.    2.  Family history patient's father tested positive for RYR1 gene with linked to malignant hyperthermia and myopathy refer to the genetic department was provided for further evaluation and testing.    3.  Dysuria urine analysis will be obtained and will be sent for culture and treatment will take place if needed.    4.  Vulvar irritation vaginal swab will be obtained and we will discuss results with the patient once available and treat accordingly.    5.  Healthcare maintenance issues Pap smear is up-to-date and preventative care measures are up-to-date.    Mammogram will be requested to further evaluate the increased density in the left outer breast on exam today.    Disposition I will see the patient back in the office in 1 year for repeat physical and sooner if needed.

## 2024-11-20 ENCOUNTER — TELEPHONE (OUTPATIENT)
Dept: GENETICS | Facility: CLINIC | Age: 34
End: 2024-11-20
Payer: MEDICARE

## 2024-11-20 LAB
APPEARANCE UR: CLEAR
BACTERIA #/AREA URNS AUTO: ABNORMAL /HPF
BILIRUB UR STRIP.AUTO-MCNC: NEGATIVE MG/DL
CLUE CELLS VAG LPF-#/AREA: ABNORMAL /[LPF]
COLOR UR: ABNORMAL
GLUCOSE UR STRIP.AUTO-MCNC: NORMAL MG/DL
KETONES UR STRIP.AUTO-MCNC: NEGATIVE MG/DL
LEUKOCYTE ESTERASE UR QL STRIP.AUTO: NEGATIVE
NITRITE UR QL STRIP.AUTO: NEGATIVE
NUGENT SCORE: 1
PH UR STRIP.AUTO: 6 [PH]
PROT UR STRIP.AUTO-MCNC: NEGATIVE MG/DL
RBC # UR STRIP.AUTO: ABNORMAL /UL
RBC #/AREA URNS AUTO: ABNORMAL /HPF
SP GR UR STRIP.AUTO: 1.01
SQUAMOUS #/AREA URNS AUTO: ABNORMAL /HPF
UROBILINOGEN UR STRIP.AUTO-MCNC: NORMAL MG/DL
WBC #/AREA URNS AUTO: ABNORMAL /HPF
YEAST VAG WET PREP-#/AREA: PRESENT

## 2024-11-21 ENCOUNTER — LAB (OUTPATIENT)
Dept: LAB | Facility: LAB | Age: 34
End: 2024-11-21
Payer: MEDICARE

## 2024-11-21 DIAGNOSIS — Z00.00 HEALTH CARE MAINTENANCE: ICD-10-CM

## 2024-11-21 LAB
ALBUMIN SERPL BCP-MCNC: 4.5 G/DL (ref 3.4–5)
ALP SERPL-CCNC: 53 U/L (ref 33–110)
ALT SERPL W P-5'-P-CCNC: 11 U/L (ref 7–45)
ANION GAP SERPL CALC-SCNC: 11 MMOL/L (ref 10–20)
AST SERPL W P-5'-P-CCNC: 12 U/L (ref 9–39)
BACTERIA UR CULT: NORMAL
BILIRUB SERPL-MCNC: 0.4 MG/DL (ref 0–1.2)
BUN SERPL-MCNC: 12 MG/DL (ref 6–23)
CALCIUM SERPL-MCNC: 9.3 MG/DL (ref 8.6–10.6)
CHLORIDE SERPL-SCNC: 105 MMOL/L (ref 98–107)
CHOLEST SERPL-MCNC: 164 MG/DL (ref 0–199)
CHOLESTEROL/HDL RATIO: 3.7
CO2 SERPL-SCNC: 27 MMOL/L (ref 21–32)
CREAT SERPL-MCNC: 0.55 MG/DL (ref 0.5–1.05)
EGFRCR SERPLBLD CKD-EPI 2021: >90 ML/MIN/1.73M*2
ERYTHROCYTE [DISTWIDTH] IN BLOOD BY AUTOMATED COUNT: 13.2 % (ref 11.5–14.5)
GLUCOSE SERPL-MCNC: 88 MG/DL (ref 74–99)
HCT VFR BLD AUTO: 37.8 % (ref 36–46)
HDLC SERPL-MCNC: 44.8 MG/DL
HGB BLD-MCNC: 12.5 G/DL (ref 12–16)
LDLC SERPL CALC-MCNC: 108 MG/DL
MCH RBC QN AUTO: 30 PG (ref 26–34)
MCHC RBC AUTO-ENTMCNC: 33.1 G/DL (ref 32–36)
MCV RBC AUTO: 91 FL (ref 80–100)
NON HDL CHOLESTEROL: 119 MG/DL (ref 0–149)
NRBC BLD-RTO: 0 /100 WBCS (ref 0–0)
PLATELET # BLD AUTO: 268 X10*3/UL (ref 150–450)
POTASSIUM SERPL-SCNC: 4.1 MMOL/L (ref 3.5–5.3)
PROT SERPL-MCNC: 6.6 G/DL (ref 6.4–8.2)
RBC # BLD AUTO: 4.16 X10*6/UL (ref 4–5.2)
SODIUM SERPL-SCNC: 139 MMOL/L (ref 136–145)
TRIGL SERPL-MCNC: 55 MG/DL (ref 0–149)
TSH SERPL-ACNC: 1.18 MIU/L (ref 0.44–3.98)
VLDL: 11 MG/DL (ref 0–40)
WBC # BLD AUTO: 4.1 X10*3/UL (ref 4.4–11.3)

## 2024-11-21 PROCEDURE — 80053 COMPREHEN METABOLIC PANEL: CPT

## 2024-11-21 PROCEDURE — 36415 COLL VENOUS BLD VENIPUNCTURE: CPT

## 2024-11-21 PROCEDURE — 85027 COMPLETE CBC AUTOMATED: CPT

## 2024-11-21 PROCEDURE — 80061 LIPID PANEL: CPT

## 2024-11-21 PROCEDURE — 84443 ASSAY THYROID STIM HORMONE: CPT

## 2024-11-22 DIAGNOSIS — B37.31 YEAST VAGINITIS: Primary | ICD-10-CM

## 2024-11-22 RX ORDER — FLUCONAZOLE 150 MG/1
TABLET ORAL
Qty: 2 TABLET | Refills: 0 | Status: SHIPPED | OUTPATIENT
Start: 2024-11-22

## 2024-12-09 ENCOUNTER — PROCEDURE VISIT (OUTPATIENT)
Dept: NEUROLOGY | Facility: CLINIC | Age: 34
End: 2024-12-09
Payer: MEDICARE

## 2024-12-09 DIAGNOSIS — G43.709 CHRONIC MIGRAINE WITHOUT AURA WITHOUT STATUS MIGRAINOSUS, NOT INTRACTABLE: Primary | ICD-10-CM

## 2024-12-09 PROCEDURE — 64615 CHEMODENERV MUSC MIGRAINE: CPT | Performed by: STUDENT IN AN ORGANIZED HEALTH CARE EDUCATION/TRAINING PROGRAM

## 2024-12-09 PROCEDURE — 2500000004 HC RX 250 GENERAL PHARMACY W/ HCPCS (ALT 636 FOR OP/ED): Performed by: STUDENT IN AN ORGANIZED HEALTH CARE EDUCATION/TRAINING PROGRAM

## 2024-12-09 PROCEDURE — 96372 THER/PROPH/DIAG INJ SC/IM: CPT | Performed by: STUDENT IN AN ORGANIZED HEALTH CARE EDUCATION/TRAINING PROGRAM

## 2024-12-09 RX ORDER — UBROGEPANT 100 MG/1
TABLET ORAL
Qty: 16 TABLET | Refills: 6 | Status: SHIPPED | OUTPATIENT
Start: 2024-12-09

## 2024-12-09 RX ORDER — EPTINEZUMAB-JJMR 100 MG/ML
100 INJECTION INTRAVENOUS
Qty: 1 ML | Refills: 3 | Status: SHIPPED | OUTPATIENT
Start: 2024-12-09

## 2024-12-09 NOTE — PROGRESS NOTES
Neurology Botulinum Injection    Subjective   Tobias Mejia is an 34 y.o. female here for medical botulinum injection for Chronic migraine without aura without status migrainosus, not intractable [G43.709].     Patient was dropped from insurance after last Botox. Currently having 30/30 HA days per month. Has not had a Vyepti infusion. Will aim to restart Botox and Ubrelvy, as well as start Vyepti.    Physical Exam  Vitals reviewed.       Botox    Date/Time: 12/9/2024 2:51 PM    Performed by: Brent Alarcon DO  Authorized by: Brent Alarcon DO    Procedure specific details:       Procedure Note: PREEMPT Botox     Indications: Chronic Migraine     Informed consent was obtained (explaining the procedure and risks and benefits of procedure) from patient: the signed consent form was placed in the medical record.  A time out was completed, verifying correct patient, procedure,site, positioning, and implants or special equipment.     200 units of OnabotulinumtoxinA were reconstituted with saline. Sites of injection were identified via PREEMPT protocol and cleaned with alcohol pads. Using a 30 gauge needle, patient received following injections:     5 units in procerus  5 units in each left and right   10 units divided between 2 sites of L frontalis  10 units divided between 2 sites of R frontalis  20 units divided between 4 sites of L temporalis  20 units divided between 4 sites of R temporalis  15 units divided between 3 sites of L occipitalis  15 units divided between 3 sites of R occipitalis  10 units divided between 2 sites of L paracervical muscles  10 units divided between 2 sites of R paracervical muscles  15 units divided between 3 sites of L trapezius  15 units divided between 3 sites of R trapezius     Additional Sites:  10u L temporalis  10u R temporalis  5u L masseter  5u R masseter     Used: 185u  Wasted: 15u     There were no complications. Patient was comfortable and left without complaint.       OnabotulinumtoxinA  Lot #: [id number]  Exp: 4/2027

## 2024-12-11 ENCOUNTER — TELEPHONE (OUTPATIENT)
Dept: PRIMARY CARE | Facility: CLINIC | Age: 34
End: 2024-12-11
Payer: MEDICARE

## 2024-12-19 ENCOUNTER — APPOINTMENT (OUTPATIENT)
Dept: RADIOLOGY | Facility: HOSPITAL | Age: 34
End: 2024-12-19
Payer: MEDICARE

## 2024-12-27 ENCOUNTER — TELEPHONE (OUTPATIENT)
Dept: PRIMARY CARE | Facility: CLINIC | Age: 34
End: 2024-12-27
Payer: MEDICARE

## 2024-12-27 DIAGNOSIS — B37.31 YEAST VAGINITIS: Primary | ICD-10-CM

## 2024-12-27 RX ORDER — FLUCONAZOLE 150 MG/1
TABLET ORAL
Qty: 2 TABLET | Refills: 1 | Status: SHIPPED | OUTPATIENT
Start: 2024-12-27

## 2024-12-27 NOTE — TELEPHONE ENCOUNTER
Pt was in office in November for cpe but also had yeast infection - took  all medication that was given at time  - also went and bought from  the store 7 day medication 3times - still has not gone away - please advise what pt can do to help

## 2025-01-06 ENCOUNTER — APPOINTMENT (OUTPATIENT)
Dept: NEUROLOGY | Facility: CLINIC | Age: 35
End: 2025-01-06
Payer: MEDICAID

## 2025-01-08 ENCOUNTER — SPECIALTY PHARMACY (OUTPATIENT)
Dept: PHARMACY | Facility: CLINIC | Age: 35
End: 2025-01-08

## 2025-01-08 PROCEDURE — RXMED WILLOW AMBULATORY MEDICATION CHARGE

## 2025-01-08 NOTE — PROGRESS NOTES
HISTORY OF PRESENT ILLNESS:  Ms. Mejia is a 34 y.o. female referred by Dr. Ramos for a family history of a a RYR1 mutation.  She is accompanied to her in-person visit alone.    Father started complaining about pain in his back in   He started having pain in legs this past year--- which has caused problems with using stairs   This pain has gotten progressively worse this year  Her father played sports growing up with no issues  He never had surgery  Of note, her father had two sisters that passed away during surgery  His primary care ordered genetic testing via [facility]'s 72-gene Comprehensive Myopathy panel  He was found to have a pathogenic RYR1 mutation located at c.88741K>C (p.Max1528Mmu)-- a copy of his genetic test report was scanned into Tobias's chart.  After getting the results from this testing, he started seeing a neuromuscular doctor    GENETIC TESTING:  No     MEDICAL CONCERNS:  Migraines  Frequent yeast infections and UTIs  Environmental allergies    Denies muscle fatigue/weakness    RECENT SPECIALISTS (within the past couple of years):  24-- seen for medical botulinum injection for chronic migraine without aura without status migrainosus, not intractable-- dropped from insurance after last Botox-- currently having 30/30 HA days per month-- has not had Vyepti infusion-- will aim to restart Botox and Ubrelvy as well as start Vyepti    24-- dermatology-- seen for rash on chest and for a skin check-- recommended biopsy of some concerning lesions-- dx with tinea versicolor of chest-- patient to apply ketoconazole -- call if worsening or fails to improve-- clinically benign appearing nevi-- prescribed oral minoxidil for alopecia    SURGERIES:  Never had surgery    HOSPITALIZATIONS (within the past year):  No     REPRODUCTIVE HISTORY:       SOCIAL HISTORY:  Currently works as      FAMILY HISTORY:  The pertinent family history is as follows:   Daughter, 4.5 years old, has  sagittal craniosynostosis s/p repair (she stopped breathing during this surgery).  Mother was diagnosed with ocular melanoma around 51. She passed away at 56. She had environmental allergies.  Fraternal twin sister, 33 years old, has gluten sensitivity. She is reported to have tested positive for the familial RYR1 mutation (unknown if she just had familial testing or if she had a panel done-- a copy of her test report was not available for my review).  Maternal first cousin, 46 year old female, has migraines.  Maternal first cousin, 40 year old male, had an episode of face swelling after an illness at 39.  Maternal grandmother passed away from COVID. She had Parkinson's and lupus.    Father has a pathogenic RYR1 mutation located at c.95647S>C (p.Nlv2995Spf). He had the 72-gene [facility] Comprehensive Myopathy panel done (a copy of his genetic test report was scanned into Tobias's chart).  Paternal aunt passed away during surgery at 25.  Paternal aunt passed away during surgery at 30.  Paternal aunt, 70, was diagnosed with breast cancer in her late-50's.  Paternal uncle, 67 years old, has 'back problems.'  Paternal grandmother had ovarian cancer (age of diagnosis is unknown). She passed away in her 80's.  Paternal grandfather had lung cancer (age of diagnosis is unknown). He passed away at 49 (he was in the ).    Consanguinity and Ashkenazi [place] ancestry were denied. The patient's maternal side is of Italian descent, and the patient's paternal side is of Italian descent. The remainder of the family history was negative for intellectual disability, birth defects, miscarriages/stillbirths, blindness, deafness, kidney disease, heart disease, cancer, muscle disease, and blood disorders.     DISCUSSION:  Ms. Mejia is a 34 y.o. female referred by Dr. Ramos for a family history of a a RYR1 mutation. Her father had genetic testing via [facility]'s 72-gene Comprehensive Myopathy Panel. 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 This mutation is associated with autosomal dominant and recessive myopathies and autosomal dominant malignant hyperthermia susceptibility.    More specifically, the RYR1 gene is associated with:  autosomal dominant and recessive central core diease (CCD),  CCD is primary autosomal dominat  autosomal recessive congential myopathy with fiber-type disproportion (CFTD),   autosomal recessive multiminicore disease (MmD) and   autosomal recessive centronuclear myopathy  (CNM)  In addition, the RYR1 gene is associated with malignant hyperthermia susceptibility type 1.    The variant that her father was found to have has been seen in individuals with autosomal dominant CCD and malignant hyperthermia (PMID: 03791310).    We discussed that malignant hyperthermia (MH) is a condition of uncontrolled muscle contractions that can happen during or after one recieves a triggering drug.   This can lead to elevation of body temperature, and dangerous muscle breakdown, with risk for heart rhythm disturbances, kidney damage, and muscle damage.  This condition can sometimes be fatal.  These episodes can usually be avoided by avoiding the triggering drugs, most of which are anesthetic agents.  If it occurs, it can be treated with a medication called dantrolene.     People who with MH may not experience a MH episode every time they receive anesthesia, and it is difficult to predict when they will have an episode.  Therefore, AVOIDANCE OF THE TRIGGERING DRUGS IS RECOMMENDED ONCE THE SUSCEPTIBILITY IS KNOWN.    Individuals with CCD may present with (PMID: 38075630):  congenital muscle hypotonia (low muscle tone),   pronounced proximal weakness,   delayed motor development, and   slightly elevated creatine kinase (CK) levels    In addition, skeletal anomalies such as congenital hip displacement and scoliosis are frequent.   Later in life muscle strength may improve,  but in rare cases progressive muscle weakness is observed.    The RYR1 mutation that her dad was found to have is inherited in a dominant fashion.  Tobias has a 50% chance of having the RYR1 mutation that was identified in her father.  IF she tests positive for the familial RYR1 mutation, her daughter would be at a 50% chance of having also inherited the familial RYR1 mutation.    Additionally, we discussed the Genetic Information Nondiscrimination Act (ANA).   ANA prohibits discrimination by health insurance plans and employers based on one's genetic information.  ANA does not apply to life, long-term care or disability insurance. These insurers are allowed to use genetic, personal or family health information to make coverage or premium decisions.   Some states have their own genetic protection laws, providing additional security against genetic discrimination for these types of insurance.  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 Testing will be ordered through [facility] (where her father had his testing).     Tobias reported that her mother had uveal melanoma and her paternal grandmother had ovarian cancer.   I briefly discussed with her that cancer genetic counseling would be an option given her family history of cancer.  Should she wish to further explore the possibility of inherited cancer susceptibility in his/her family, an appointment with our Cancer Genetics Clinic can be made by calling [phone].    At that time,  an assessment of her family history of cancer, cancer genetic testing, personal cancer risk and options for risk reduction would then be discussed.    I explained that normally testing an affected family member is most informative, however, this is not always possible.  If these individuals are unable or unwilling to have cancer genetic counseling, other family members may seek cancer genetic counseling based on this family history.    Her test results may be released in [facility] when they are reported.   We have a scheduled a time to review these results in detail (2/13/25 at 3).  If you choose to view your results in advance of his visit, I may not be available to discuss them at that exact time.  Most parents choose to review their results with their provider at the follow-up visit.     Total time spent on day of encounter: 54 minutes (44 minutes with patient, 10 minutes on pre/post patient care activities, including documentation).    Physician documentation of roles:  I reviewed the results, history, family history, did a physical exam and provided genetic counseling.     Melisa Simon MD

## 2025-01-09 ENCOUNTER — APPOINTMENT (OUTPATIENT)
Dept: GENETICS | Facility: CLINIC | Age: 35
End: 2025-01-09
Payer: MEDICARE

## 2025-01-09 ENCOUNTER — SPECIALTY PHARMACY (OUTPATIENT)
Dept: PHARMACY | Facility: CLINIC | Age: 35
End: 2025-01-09

## 2025-01-09 VITALS
TEMPERATURE: 96.9 F | DIASTOLIC BLOOD PRESSURE: 79 MMHG | WEIGHT: 138.45 LBS | BODY MASS INDEX: 22.25 KG/M2 | HEIGHT: 66 IN | HEART RATE: 78 BPM | SYSTOLIC BLOOD PRESSURE: 117 MMHG

## 2025-01-09 DIAGNOSIS — Z84.89 FAMILY HISTORY OF GENETIC DISEASE: ICD-10-CM

## 2025-01-09 DIAGNOSIS — Z80.8 FAMILY HX OF MELANOMA: ICD-10-CM

## 2025-01-09 DIAGNOSIS — Z15.89 GENETIC SUSCEPTIBILITY TO MALIGNANT HYPERTHERMIA DUE TO RYR1 GENE MUTATION: ICD-10-CM

## 2025-01-09 DIAGNOSIS — Z80.41 FAMILY HISTORY OF OVARIAN CANCER: ICD-10-CM

## 2025-01-10 ENCOUNTER — OFFICE VISIT (OUTPATIENT)
Dept: URGENT CARE | Age: 35
End: 2025-01-10
Payer: MEDICARE

## 2025-01-10 ENCOUNTER — PHARMACY VISIT (OUTPATIENT)
Dept: PHARMACY | Facility: CLINIC | Age: 35
End: 2025-01-10
Payer: COMMERCIAL

## 2025-01-10 VITALS
TEMPERATURE: 97.8 F | SYSTOLIC BLOOD PRESSURE: 147 MMHG | HEIGHT: 66 IN | RESPIRATION RATE: 17 BRPM | DIASTOLIC BLOOD PRESSURE: 95 MMHG | WEIGHT: 138 LBS | OXYGEN SATURATION: 99 % | BODY MASS INDEX: 22.18 KG/M2 | HEART RATE: 76 BPM

## 2025-01-10 DIAGNOSIS — B37.9 YEAST INFECTION: ICD-10-CM

## 2025-01-10 DIAGNOSIS — N30.00 ACUTE CYSTITIS WITHOUT HEMATURIA: Primary | ICD-10-CM

## 2025-01-10 DIAGNOSIS — R39.198 ABNORMALITY OF URINATION: ICD-10-CM

## 2025-01-10 PROBLEM — Z80.8 FAMILY HX OF MELANOMA: Status: ACTIVE | Noted: 2025-01-10

## 2025-01-10 PROBLEM — Z84.89 FAMILY HISTORY OF GENETIC DISEASE: Status: ACTIVE | Noted: 2025-01-10

## 2025-01-10 PROBLEM — Z80.41 FAMILY HISTORY OF OVARIAN CANCER: Status: ACTIVE | Noted: 2025-01-10

## 2025-01-10 LAB
POC APPEARANCE, URINE: ABNORMAL
POC BILIRUBIN, URINE: NEGATIVE
POC BLOOD, URINE: NEGATIVE
POC COLOR, URINE: ABNORMAL
POC GLUCOSE, URINE: NEGATIVE MG/DL
POC KETONES, URINE: NEGATIVE MG/DL
POC LEUKOCYTES, URINE: NEGATIVE
POC NITRITE,URINE: NEGATIVE
POC PROTEIN, URINE: NEGATIVE MG/DL
PREGNANCY TEST URINE, POC: NEGATIVE

## 2025-01-10 PROCEDURE — 87086 URINE CULTURE/COLONY COUNT: CPT

## 2025-01-10 PROCEDURE — 87205 SMEAR GRAM STAIN: CPT

## 2025-01-10 RX ORDER — SULFAMETHOXAZOLE AND TRIMETHOPRIM 800; 160 MG/1; MG/1
1 TABLET ORAL 2 TIMES DAILY
Qty: 14 TABLET | Refills: 0 | Status: SHIPPED | OUTPATIENT
Start: 2025-01-10

## 2025-01-10 ASSESSMENT — ENCOUNTER SYMPTOMS
GASTROINTESTINAL NEGATIVE: 1
CARDIOVASCULAR NEGATIVE: 1
HEMATURIA: 0
RESPIRATORY NEGATIVE: 1
CHILLS: 0
PSYCHIATRIC NEGATIVE: 1
FREQUENCY: 1
FLANK PAIN: 0
DYSURIA: 1
ABDOMINAL PAIN: 0
FEVER: 0

## 2025-01-10 NOTE — PATIENT INSTRUCTIONS
PLAN:  Tobias received genetic counseling regarding the benefits, risks, and limitations of genetic testing and has elected to proceed with genetic testing via the known familial RYR1 c.59016H>C (p.[id number]) mutation that her relatives were found to have. Testing will be ordered through [facility] (where her father had his testing). This test should be of no cost to Tobias as she is within the no-charge familial testing window.  A buccal (cheek) swab was collected at the appointment.  She is scheduled for an in-person follow-up appointment on 2/13/25 at .  If you have any questions before that, please contact:    Dorina Ambrose MS, [facility]  Certified Genetic Counselor  [facility]  [phone]    Reviewed by:  Dr. Melisa Simon  Clinical   [facility]  [phone]

## 2025-01-10 NOTE — PROGRESS NOTES
Subjective   Patient ID: Tobias Mejia is a 34 y.o. female. They present today with a chief complaint of No chief complaint on file..    History of Present Illness  Urinary Tract Infection: Patient complains of burning with urination, frequency, suprapubic pressure, and urgency She has had symptoms for a few weeks. Patient also complains of vaginal itching. Patient denies vaginal discharge, back pain and fever. Patient does not have a history of recurrent UTI.  Patient does not have a history of pyelonephritis.         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    Strawberry Anaphylaxis 02/27/2013    Copper Unknown 06/30/2022    House dust mite Other 04/24/2023    Latex Hives 05/20/2024    Tree and shrub pollen Itching 04/24/2023       (Not in a hospital admission)       Past Medical History:   Diagnosis Date    Migraine with aura, intractable, with status migrainosus 12/17/2021    Intractable migraine with aura with status migrainosus    Other conditions influencing health status 05/20/2022    Chronic migraine    Other specified endocrine disorders 01/29/2022    Cyst of pineal gland       Past Surgical History:   Procedure Laterality Date    MR HEAD ANGIO WO IV CONTRAST  1/10/2022    MR HEAD ANGIO WO IV CONTRAST 1/10/2022 [facility]    OTHER SURGICAL HISTORY  05/03/2022    No history of surgery        reports that she has never smoked. She has never been exposed to tobacco smoke. She has never used smokeless tobacco. She reports that she does not currently use alcohol. She reports that she does not use drugs.    Review of Systems  Review of Systems   Constitutional:  Negative for chills and fever.   HENT: Negative.     Respiratory:  "Negative.     Cardiovascular: Negative.    Gastrointestinal: Negative.  Negative for abdominal pain.   Genitourinary:  Positive for dysuria, frequency and urgency. Negative for flank pain, hematuria and vaginal discharge.   Skin: Negative.    Psychiatric/Behavioral: Negative.     All other systems reviewed and are negative.                                 Objective    Vitals:    01/10/25 1830   BP: (!) 147/95   Pulse: 76   Resp: 17   Temp: 36.6 °C (97.8 °F)   SpO2: 99%   Weight: 62.6 kg (138 lb)   Height: 1.676 m (5' 6\")     No LMP recorded.    Physical Exam  Vitals and nursing note reviewed.   Constitutional:       Appearance: She is not ill-appearing or toxic-appearing.   Cardiovascular:      Rate and Rhythm: Normal rate and regular rhythm.      Pulses: Normal pulses.      Heart sounds: Normal heart sounds.   Pulmonary:      Effort: Pulmonary effort is normal.      Breath sounds: Normal breath sounds.   Abdominal:      General: Abdomen is flat. Bowel sounds are normal.      Palpations: Abdomen is soft.      Tenderness: There is abdominal tenderness (suprapubic). There is no right CVA tenderness or left CVA tenderness.   Genitourinary:     Vagina: No vaginal discharge.   Skin:     General: Skin is warm and dry.      Capillary Refill: Capillary refill takes less than 2 seconds.   Neurological:      Mental Status: She is alert and oriented to person, place, and time.   Psychiatric:         Behavior: Behavior normal.         Procedures    Point of Care Test & Imaging Results from this visit  No results found for this visit on 01/10/25.   No results found.    Diagnostic study results (if any) were reviewed by BOLA Rush.    Assessment/Plan   Allergies, medications, history, and pertinent labs/EKGs/Imaging reviewed by BOLA Rush.     Medical Decision Making  Risks, benefits, and alternatives of the medications and treatment plan prescribed today were discussed, and patient expressed " understanding. Plan follow up as discussed or as needed if any worsening symptoms or change in condition. Reinforced red flags including (but not limited to): severe or worsening pain; difficulty swallowing; stiff neck; shortness of breath; coughing or vomiting blood; chest pain; and new or increased fever are indications to go to the Emergency Department.  At time of discharge patient was clinically well-appearing and HDS for outpatient management. The patient and/or family was educated regarding diagnosis, supportive care, OTC and Rx medications. The patient and/or family was given the opportunity to ask questions prior to discharge.  They verbalized understanding of my discussion of the plans for treatment, expected course, indications to return to  or seek further evaluation in ED, and the need for timely follow up as directed.   They were provided with a work/school excuse if requested. The after-visit summary was given to the patient and care instructions were reviewed with the patient. All questions were answered and the patient verbalized understanding of the plan of care for today.  Plan:  Recent visit notes reviewed  Meds as above  Increase clear fluids  Pcp follow up this week if not improving or worsening  ER visit anytime 24/7 for acute worsening or changing condition      Orders and Diagnoses  Diagnoses and all orders for this visit:  Acute cystitis without hematuria  -     Urine Culture  -     Vaginitis Gram Stain For Bacterial Vaginosis + Yeast  -     sulfamethoxazole-trimethoprim (Bactrim DS) 800-160 mg tablet; Take 1 tablet by mouth 2 times a day.  Yeast infection  -     POCT UA Automated manually resulted  -     POCT pregnancy, urine manually resulted  Abnormality of urination  -     POCT UA Automated manually resulted  -     POCT pregnancy, urine manually resulted      Medical Admin Record      Patient disposition: Home    Electronically signed by BOLA Rush  7:23 PM       dyspnea; hypoxemia; COPD exacerbation; pulmonary edema; dyspnea; hypoxemia; COPD exacerbation; pulmonary edema; pleural effusions; atelectasis; obesity; restrictive lung disease; former smoker

## 2025-01-12 LAB
BACTERIA UR CULT: NORMAL
CLUE CELLS VAG LPF-#/AREA: NORMAL /[LPF]
NUGENT SCORE: 1
YEAST VAG WET PREP-#/AREA: NORMAL

## 2025-02-05 ENCOUNTER — HOSPITAL ENCOUNTER (OUTPATIENT)
Dept: RADIOLOGY | Facility: CLINIC | Age: 35
Discharge: HOME | End: 2025-02-05
Payer: MEDICARE

## 2025-02-05 VITALS — HEIGHT: 66 IN | WEIGHT: 138.01 LBS | BODY MASS INDEX: 22.18 KG/M2

## 2025-02-05 DIAGNOSIS — N64.89 OTHER SPECIFIED DISORDERS OF BREAST: ICD-10-CM

## 2025-02-05 DIAGNOSIS — N64.89 FULLNESS OF BREAST: ICD-10-CM

## 2025-02-05 PROCEDURE — 76642 ULTRASOUND BREAST LIMITED: CPT | Mod: LT

## 2025-02-05 PROCEDURE — 77062 BREAST TOMOSYNTHESIS BI: CPT | Performed by: RADIOLOGY

## 2025-02-05 PROCEDURE — 77066 DX MAMMO INCL CAD BI: CPT

## 2025-02-05 PROCEDURE — 76642 ULTRASOUND BREAST LIMITED: CPT | Performed by: RADIOLOGY

## 2025-02-05 PROCEDURE — 76981 USE PARENCHYMA: CPT | Mod: LT,RT

## 2025-02-05 PROCEDURE — 77066 DX MAMMO INCL CAD BI: CPT | Performed by: RADIOLOGY

## 2025-02-08 ENCOUNTER — SPECIALTY PHARMACY (OUTPATIENT)
Dept: PHARMACY | Facility: CLINIC | Age: 35
End: 2025-02-08

## 2025-02-13 ENCOUNTER — APPOINTMENT (OUTPATIENT)
Dept: GENETICS | Facility: CLINIC | Age: 35
End: 2025-02-13
Payer: MEDICARE

## 2025-02-25 ENCOUNTER — TELEPHONE (OUTPATIENT)
Dept: GENETICS | Facility: CLINIC | Age: 35
End: 2025-02-25
Payer: MEDICARE

## 2025-02-25 ENCOUNTER — DOCUMENTATION (OUTPATIENT)
Dept: GENETICS | Facility: CLINIC | Age: 35
End: 2025-02-25
Payer: MEDICARE

## 2025-02-25 NOTE — PROGRESS NOTES
DISCUSSION:  Tobias is a 34 y.o. female who was seen in-person in genetics on 1/9/25 due to her family history of a known RYR1 mutation located at c.44455I>C (p.[id number]) found initially in her father and later in her fraternal twin sister.  At Tobias's initial genetics appointment on 1/9/25, targeted testing for the familial RYR1 mutation was ordered through [employer] (the genetic testing company that her father had his testing through).  She cancelled her scheduled follow-up appointment on 2/13/25, so I called her today to briefly discuss her genetic test results.     Tobias was NOT FOUND TO HAVE the familial RYR1 mutation located at c.61910G>C (p.[id number]).     This means that she is NOT affected with autosomal dominant and recessive myopathies and autosomal dominant malignant hyperthermia susceptibility like her father and sister are.  Tobias's daughter could not have inherited the mutation from her since she tested negative.      Tobias indicated understanding and had no further questions at this time.  A copy of her genetic test results will be mailed to her address and a copy was uploaded to her chart.     Dorina Ambrose, [facility] [place]  Certified Genetic Counselor  [facility]  [phone]     Reviewed by:  Dr. Melisa Simon  Clinical   [facility]  [phone]

## 2025-02-26 ENCOUNTER — PHARMACY VISIT (OUTPATIENT)
Dept: PHARMACY | Facility: CLINIC | Age: 35
End: 2025-02-26
Payer: COMMERCIAL

## 2025-02-26 DIAGNOSIS — G43.709 CHRONIC MIGRAINE WITHOUT AURA WITHOUT STATUS MIGRAINOSUS, NOT INTRACTABLE: ICD-10-CM

## 2025-02-26 PROCEDURE — RXMED WILLOW AMBULATORY MEDICATION CHARGE

## 2025-02-26 RX ORDER — UBROGEPANT 100 MG/1
TABLET ORAL
Qty: 16 TABLET | Refills: 6 | Status: SHIPPED | OUTPATIENT
Start: 2025-02-26

## 2025-03-10 ENCOUNTER — PROCEDURE VISIT (OUTPATIENT)
Dept: NEUROLOGY | Facility: CLINIC | Age: 35
End: 2025-03-10
Payer: MEDICARE

## 2025-03-10 DIAGNOSIS — G43.709 CHRONIC MIGRAINE W/O AURA W/O STATUS MIGRAINOSUS, NOT INTRACTABLE: Primary | ICD-10-CM

## 2025-03-10 PROCEDURE — 64615 CHEMODENERV MUSC MIGRAINE: CPT | Performed by: STUDENT IN AN ORGANIZED HEALTH CARE EDUCATION/TRAINING PROGRAM

## 2025-03-10 PROCEDURE — 2500000004 HC RX 250 GENERAL PHARMACY W/ HCPCS (ALT 636 FOR OP/ED): Performed by: STUDENT IN AN ORGANIZED HEALTH CARE EDUCATION/TRAINING PROGRAM

## 2025-03-10 RX ORDER — TERCONAZOLE 4 MG/G
CREAM VAGINAL
COMMUNITY
Start: 2025-02-27

## 2025-03-10 RX ORDER — PHENAZOPYRIDINE HYDROCHLORIDE 200 MG/1
200 TABLET, FILM COATED ORAL EVERY 8 HOURS PRN
COMMUNITY
Start: 2025-01-22

## 2025-03-10 RX ORDER — SOLIFENACIN SUCCINATE 5 MG/1
1 TABLET, FILM COATED ORAL NIGHTLY
COMMUNITY
Start: 2025-02-27 | End: 2025-03-29

## 2025-03-10 RX ORDER — CIPROFLOXACIN 500 MG/1
1 TABLET ORAL
COMMUNITY
Start: 2025-02-10

## 2025-03-10 RX ORDER — NITROFURANTOIN 25; 75 MG/1; MG/1
CAPSULE ORAL
COMMUNITY
Start: 2025-01-20

## 2025-03-10 RX ADMIN — ONABOTULINUMTOXINA 185 UNITS: 100 INJECTION, POWDER, LYOPHILIZED, FOR SOLUTION INTRADERMAL; INTRAMUSCULAR at 08:41

## 2025-03-10 NOTE — PROGRESS NOTES
Subjective   Since last Botox, continues to have 30/30 HA days per month, but has had 20% improvement in severity. Has not been approved for Vyepti yet.    Botox    Date/Time: 3/10/2025 8:41 AM    Performed by: Brent Alarcon DO  Authorized by: Brent Alarcon DO    Procedure specific details:      Procedure Note: PREEMPT Botox     Indications: Chronic Migraine     Informed consent was obtained (explaining the procedure and risks and benefits of procedure) from patient: the signed consent form was placed in the medical record.  A time out was completed, verifying correct patient, procedure,site, positioning, and implants or special equipment.     200 units of OnabotulinumtoxinA were reconstituted with saline. Sites of injection were identified via PREEMPT protocol and cleaned with alcohol pads. Using a 30 gauge needle, patient received following injections:     5 units in procerus  5 units in each left and right   10 units divided between 2 sites of L frontalis  10 units divided between 2 sites of R frontalis  20 units divided between 4 sites of L temporalis  20 units divided between 4 sites of R temporalis  15 units divided between 3 sites of L occipitalis  15 units divided between 3 sites of R occipitalis  10 units divided between 2 sites of L paracervical muscles  10 units divided between 2 sites of R paracervical muscles  15 units divided between 3 sites of L trapezius  15 units divided between 3 sites of R trapezius     Additional Sites:  10u L temporalis  10u R temporalis  5u L masseter  5u R masseter     Used: 185u  Wasted: 15u     There were no complications. Patient was comfortable and left without complaint.      OnabotulinumtoxinA  Lot #: [id number]  Exp: 4/2027

## 2025-03-21 DIAGNOSIS — G43.709 CHRONIC MIGRAINE WITHOUT AURA WITHOUT STATUS MIGRAINOSUS, NOT INTRACTABLE: ICD-10-CM

## 2025-03-21 RX ORDER — UBROGEPANT 100 MG/1
TABLET ORAL
Qty: 16 TABLET | Refills: 6 | Status: SHIPPED | OUTPATIENT
Start: 2025-03-21

## 2025-04-03 ENCOUNTER — SPECIALTY PHARMACY (OUTPATIENT)
Dept: PHARMACY | Facility: CLINIC | Age: 35
End: 2025-04-03

## 2025-04-03 PROCEDURE — RXMED WILLOW AMBULATORY MEDICATION CHARGE

## 2025-04-04 ENCOUNTER — PHARMACY VISIT (OUTPATIENT)
Dept: PHARMACY | Facility: CLINIC | Age: 35
End: 2025-04-04
Payer: COMMERCIAL

## 2025-04-23 ENCOUNTER — SPECIALTY PHARMACY (OUTPATIENT)
Dept: PHARMACY | Facility: CLINIC | Age: 35
End: 2025-04-23

## 2025-05-07 PROCEDURE — RXMED WILLOW AMBULATORY MEDICATION CHARGE

## 2025-05-08 ENCOUNTER — PHARMACY VISIT (OUTPATIENT)
Dept: PHARMACY | Facility: CLINIC | Age: 35
End: 2025-05-08
Payer: COMMERCIAL

## 2025-06-08 ENCOUNTER — SPECIALTY PHARMACY (OUTPATIENT)
Dept: PHARMACY | Facility: CLINIC | Age: 35
End: 2025-06-08

## 2025-06-08 PROCEDURE — RXMED WILLOW AMBULATORY MEDICATION CHARGE

## 2025-06-09 ENCOUNTER — PROCEDURE VISIT (OUTPATIENT)
Dept: NEUROLOGY | Facility: CLINIC | Age: 35
End: 2025-06-09
Payer: MEDICARE

## 2025-06-09 ENCOUNTER — PHARMACY VISIT (OUTPATIENT)
Dept: PHARMACY | Facility: CLINIC | Age: 35
End: 2025-06-09
Payer: COMMERCIAL

## 2025-06-09 DIAGNOSIS — G43.709 CHRONIC MIGRAINE W/O AURA W/O STATUS MIGRAINOSUS, NOT INTRACTABLE: Primary | ICD-10-CM

## 2025-06-09 PROCEDURE — 64615 CHEMODENERV MUSC MIGRAINE: CPT | Performed by: STUDENT IN AN ORGANIZED HEALTH CARE EDUCATION/TRAINING PROGRAM

## 2025-06-09 PROCEDURE — 2500000004 HC RX 250 GENERAL PHARMACY W/ HCPCS (ALT 636 FOR OP/ED): Mod: JZ | Performed by: STUDENT IN AN ORGANIZED HEALTH CARE EDUCATION/TRAINING PROGRAM

## 2025-06-09 RX ADMIN — ONABOTULINUMTOXINA 185 UNITS: 100 INJECTION, POWDER, LYOPHILIZED, FOR SOLUTION INTRADERMAL; INTRAMUSCULAR at 09:34

## 2025-06-09 NOTE — PROGRESS NOTES
Subjective   Continues to have 30/30 HA days per month. Notes 40% improvement in severity of headaches. Denies any side effects to Botox. Has not been approved for Vyepti.     Botox    Date/Time: 6/9/2025 9:26 AM    Performed by: Brent Alarcon DO  Authorized by: Brent Alarcon DO    Procedure specific details:      Procedure Note: PREEMPT Botox     Indications: Chronic Migraine     Informed consent was obtained (explaining the procedure and risks and benefits of procedure) from patient: the signed consent form was placed in the medical record.  A time out was completed, verifying correct patient, procedure,site, positioning, and implants or special equipment.     200 units of OnabotulinumtoxinA were reconstituted with saline. Sites of injection were identified via PREEMPT protocol and cleaned with alcohol pads. Using a 30 gauge needle, patient received following injections:     5 units in procerus  5 units in each left and right   10 units divided between 2 sites of L frontalis  10 units divided between 2 sites of R frontalis  20 units divided between 4 sites of L temporalis  20 units divided between 4 sites of R temporalis  15 units divided between 3 sites of L occipitalis  15 units divided between 3 sites of R occipitalis  10 units divided between 2 sites of L paracervical muscles  10 units divided between 2 sites of R paracervical muscles  15 units divided between 3 sites of L trapezius  15 units divided between 3 sites of R trapezius     Additional Sites:  10u L temporalis  10u R temporalis  5u L masseter  5u R masseter     Used: 185u  Wasted: 15u     There were no complications. Patient was comfortable and left without complaint.      OnabotulinumtoxinA  Lot #: [id number]  Exp: 10/2027

## 2025-06-10 DIAGNOSIS — G43.709 CHRONIC MIGRAINE WITHOUT AURA WITHOUT STATUS MIGRAINOSUS, NOT INTRACTABLE: Primary | ICD-10-CM

## 2025-07-02 DIAGNOSIS — G43.709 CHRONIC MIGRAINE WITHOUT AURA WITHOUT STATUS MIGRAINOSUS, NOT INTRACTABLE: Primary | ICD-10-CM

## 2025-07-09 PROCEDURE — RXMED WILLOW AMBULATORY MEDICATION CHARGE

## 2025-07-10 ENCOUNTER — INFUSION (OUTPATIENT)
Dept: INFUSION THERAPY | Facility: CLINIC | Age: 35
End: 2025-07-10
Payer: MEDICARE

## 2025-07-10 ENCOUNTER — APPOINTMENT (OUTPATIENT)
Dept: INFUSION THERAPY | Facility: CLINIC | Age: 35
End: 2025-07-10
Payer: MEDICARE

## 2025-07-10 VITALS
DIASTOLIC BLOOD PRESSURE: 69 MMHG | OXYGEN SATURATION: 98 % | RESPIRATION RATE: 16 BRPM | SYSTOLIC BLOOD PRESSURE: 102 MMHG | HEART RATE: 63 BPM | TEMPERATURE: 96.8 F

## 2025-07-10 DIAGNOSIS — G43.709 CHRONIC MIGRAINE WITHOUT AURA WITHOUT STATUS MIGRAINOSUS, NOT INTRACTABLE: ICD-10-CM

## 2025-07-10 DIAGNOSIS — G43.709 CHRONIC MIGRAINE WITHOUT AURA WITHOUT STATUS MIGRAINOSUS, NOT INTRACTABLE: Primary | ICD-10-CM

## 2025-07-10 PROCEDURE — 96365 THER/PROPH/DIAG IV INF INIT: CPT | Performed by: NURSE PRACTITIONER

## 2025-07-10 RX ORDER — EPINEPHRINE 0.3 MG/.3ML
0.3 INJECTION SUBCUTANEOUS EVERY 5 MIN PRN
Status: CANCELLED | OUTPATIENT
Start: 2025-10-08

## 2025-07-10 RX ORDER — DIPHENHYDRAMINE HYDROCHLORIDE 50 MG/ML
50 INJECTION, SOLUTION INTRAMUSCULAR; INTRAVENOUS AS NEEDED
OUTPATIENT
Start: 2025-10-08

## 2025-07-10 RX ORDER — ALBUTEROL SULFATE 0.83 MG/ML
3 SOLUTION RESPIRATORY (INHALATION) AS NEEDED
OUTPATIENT
Start: 2025-10-08

## 2025-07-10 RX ORDER — FAMOTIDINE 10 MG/ML
20 INJECTION, SOLUTION INTRAVENOUS ONCE AS NEEDED
OUTPATIENT
Start: 2025-10-08

## 2025-07-10 RX ORDER — EPINEPHRINE 0.3 MG/.3ML
0.3 INJECTION SUBCUTANEOUS EVERY 5 MIN PRN
OUTPATIENT
Start: 2025-10-08

## 2025-07-10 RX ORDER — DIPHENHYDRAMINE HYDROCHLORIDE 50 MG/ML
50 INJECTION, SOLUTION INTRAMUSCULAR; INTRAVENOUS AS NEEDED
Status: CANCELLED | OUTPATIENT
Start: 2025-10-08

## 2025-07-10 RX ORDER — FAMOTIDINE 10 MG/ML
20 INJECTION, SOLUTION INTRAVENOUS ONCE AS NEEDED
Status: CANCELLED | OUTPATIENT
Start: 2025-10-08

## 2025-07-10 RX ORDER — ALBUTEROL SULFATE 0.83 MG/ML
3 SOLUTION RESPIRATORY (INHALATION) AS NEEDED
Status: CANCELLED | OUTPATIENT
Start: 2025-10-08

## 2025-07-10 ASSESSMENT — ENCOUNTER SYMPTOMS
SHORTNESS OF BREATH: 0
MYALGIAS: 0
APPETITE CHANGE: 0
LIGHT-HEADEDNESS: 1
ABDOMINAL PAIN: 0
BLOOD IN STOOL: 0
COUGH: 0
LEG SWELLING: 0
CONSTIPATION: 0
UNEXPECTED WEIGHT CHANGE: 0
FATIGUE: 0
EYE PROBLEMS: 0
VOMITING: 0
DYSURIA: 0
WHEEZING: 0
NAUSEA: 0
PALPITATIONS: 0
FEVER: 0
TROUBLE SWALLOWING: 0
BRUISES/BLEEDS EASILY: 0
HEADACHES: 1
DIZZINESS: 0
CHILLS: 0
HEMATURIA: 0
SORE THROAT: 0
WOUND: 0
FREQUENCY: 0
VOICE CHANGE: 0
ARTHRALGIAS: 0
EXTREMITY WEAKNESS: 0
NUMBNESS: 0
DIARRHEA: 0

## 2025-07-10 ASSESSMENT — PAIN SCALES - GENERAL
PAINLEVEL_OUTOF10: 6
PAINLEVEL_OUTOF10: 0-NO PAIN

## 2025-07-10 NOTE — PATIENT INSTRUCTIONS
Today you received: VYEPTI 100MG INFUSION Q90 DAYS (1ST DOSE)      For:    1. Chronic migraine without aura without status migrainosus, not intractable            Please read the  Medication Guide that was given to you and reviewed during todays visit.     (Tell all doctors including dentists that you are taking this medication)     Go to the emergency room or call 911 if:  -You have signs of allergic reaction:   o         Rash, hives, itching.   o         Swollen, blistered, peeling skin.   o         Swelling of face, lips, mouth, tongue or throat.   o         Tightness of chest, trouble breathing, swallowing or talking      Call your doctor:     - If IV / injection site gets red, warm, swollen, itchy or leaks fluid or pus.     (Leave dressing on your IV site for at least 2 hours and keep area clean and dry    - If you get sick or have symptoms of infection or are not feeling well for any reason.    (Wash your hands often, stay away from people who are sick)    - If you have side effects from your medication that do not go away or are bothersome.     (Refer to the teaching your nurse gave you for side effects to call your doctor about)     Common side effects may include:  stuffy nose, headache, feeling tired, muscle aches, upset stomach    - Before receiving any vaccines, Call the [facility] at [phone]     - You get sick, are on antibiotics, have had a recent vaccine, have surgery or dental work and your doctor wants your visit rescheduled.    - You need to cancel and reschedule your visit for any reason. Call at least 2 days before your visit if you need to cancel.     - Your insurance changes before your next visit.    (We will need to get approval from your new insurance. This can take up to two weeks.)     The [facility] is opened Monday thru Friday 8am-8pm and Saturday 8am-4:30pm. We are closed on holidays.     Voice mail will take your call if the center is closed. If  you leave a message please allow 24 hours for a call back during weekdays. If you leave a message on a weekend/holiday, we will call you back the next business day.

## 2025-07-10 NOTE — PROGRESS NOTES
[facility]   Infusion Clinic Note   Date: July 10, 2025   Name: Tobias Mejia  : 1990   MRN: [id number]         Reason for Visit: OP Infusion (PATIENT HERE FOR HER VYEPTI 100MG INFUSION Q90 DAYS )         Today: We administered eptinezumab (Vyepti) 100 mg in sodium chloride 0.9% 101 mL IV.       Ordered By: Brent Alarcon DO       For a Diagnosis of: Chronic migraine without aura without status migrainosus, not intractable       At today's visit patient accompanied by: Self      Today's Vitals:   Vitals:    07/10/25 1407 07/10/25 1512   BP: 102/64 102/69   Pulse: 68 63   Resp: 16 16   Temp: 36.1 °C (97 °F) 36 °C (96.8 °F)   SpO2: 98% 98%   PainSc:   6 0-No pain   PainLoc: Head              Pre - Treatment Checklist:      - Previous reaction to current treatment: 1ST DOSE      (Assess patient for the concerns below. Document provider notification as appropriate).  - Active or recent infection with/without current antibiotic use: no  - Recent or planned invasive dental work: no  - Recent or planned surgeries: no  - Recently received or plans to receive vaccinations: no  - Has treatment related toxicities: no  - Any chance may be pregnant:  no      Pain: 0   - Is the pain different from normal: no   - Is prescribing Doctor aware:  yes      Labs: Reviewed       Fall Risk Screening: Bianca Fall Risk  History of Falling, Immediate or Within 3 Months: No  Secondary Diagnosis: No  Ambulatory Aid: Walks without aid/bedrest/nurse assist  Intravenous Therapy/Heparin Lock: No  Gait/Transferring: Normal/bedrest/immobile  Mental Status: Oriented to own ability  Valenzuela Fall Risk Score: 0       Review Of Systems:  Review of Systems   Constitutional:  Negative for appetite change, chills, fatigue, fever and unexpected weight change.   HENT:   Negative for hearing loss, mouth sores, sore throat, tinnitus, trouble swallowing and voice change.    Eyes:  Negative for eye problems.   Respiratory:   Negative for cough, shortness of breath and wheezing.    Cardiovascular:  Negative for chest pain, leg swelling and palpitations.   Gastrointestinal:  Negative for abdominal pain, blood in stool, constipation, diarrhea, nausea and vomiting.   Genitourinary:  Negative for dysuria, frequency and hematuria.    Musculoskeletal:  Negative for arthralgias and myalgias.   Skin:  Negative for itching, rash and wound.   Neurological:  Positive for headaches and light-headedness. Negative for dizziness, extremity weakness and numbness.        CHRONIC MIGRAINES   Hematological:  Does not bruise/bleed easily.         Infusion Readiness:  - Assessment Concerns Related to Infusion: No  - Provider notified: n/a      New Patient Education:    NEW PATIENT MEDICATION EDUCATION PT PROVIDED WITH WRITTEN ([employer] PT EDUCATION SHEET) AND VERBAL EDUCATION REGARDING MEDICATION GIVEN. VERIFIED MEDICATION NAME WITH PATIENT AND DISCUSSED REASON FOR USE. BRIEFLY DISCUSSED HOW MEDICATION WORKS AND EDUCATED ON GOAL OF TREATMENT, FREQUENCY OF TREATMENT, ADVERSE RXN'S AND COMMON SIDE EFFECTS TO MONITOR FOR. INSTRUCTED PT TO ASSURE THAT ALL PROVIDERS INCLUDING DENTISTS ARE AWARE OF MEDICATION RECEIVED. DISCUSSED FLOW OF VISIT AND ORIENTED TO INFUSION CENTER. PT VERBALIZES UNDERSTANDING. CALL LIGHT PROVIDED AND PT AWARE TO ALERT STAFF OF ANY CONCERNS DURING TREATMENT.        Treatment Conditions & Drug Specific Questions:    Eptinezumab  (VYEPTI)    (Unless otherwise specified on patient specific therapy plan):     DRUG SPECIFIC QUESTIONS:  Any recent cardiovascular or cerebrovascular ischemic events such as a stroke or heart attack?  No  (if yes hold and notify prescribing provider. Avoid use)     REMINDER:  Recommended Vitals/Observation:  Vitals: Monitor vitals at start and end of infusion, end of monitoring period (1st infusion), and as needed.  Observation: Patient may leave 15 minutes post-infusion for FIRST infusion. Patient may leave  immediately upon completion for all subsequent infusions        Weight Based Drug Calculations:    WEIGHT BASED DRUGS: NOT APPLICABLE / FLAT DOSE       Post Treatment: Patient tolerated treatment without issue and was discharged in no apparent distress.      Note Authored / Patient Cared for By: Maria Eugenia Ewing RN

## 2025-07-14 ENCOUNTER — PHARMACY VISIT (OUTPATIENT)
Dept: PHARMACY | Facility: CLINIC | Age: 35
End: 2025-07-14
Payer: COMMERCIAL

## 2025-08-11 PROCEDURE — RXMED WILLOW AMBULATORY MEDICATION CHARGE

## 2025-08-12 ENCOUNTER — PHARMACY VISIT (OUTPATIENT)
Dept: PHARMACY | Facility: CLINIC | Age: 35
End: 2025-08-12
Payer: COMMERCIAL

## 2025-10-08 ENCOUNTER — APPOINTMENT (OUTPATIENT)
Dept: INFUSION THERAPY | Facility: CLINIC | Age: 35
End: 2025-10-08
Payer: MEDICARE

## 2025-11-20 ENCOUNTER — APPOINTMENT (OUTPATIENT)
Dept: PRIMARY CARE | Facility: CLINIC | Age: 35
End: 2025-11-20
Payer: MEDICARE